# Patient Record
Sex: MALE | Race: WHITE | NOT HISPANIC OR LATINO | Employment: FULL TIME | URBAN - METROPOLITAN AREA
[De-identification: names, ages, dates, MRNs, and addresses within clinical notes are randomized per-mention and may not be internally consistent; named-entity substitution may affect disease eponyms.]

---
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
## 2018-01-13 VITALS
BODY MASS INDEX: 34.91 KG/M2 | OXYGEN SATURATION: 98 % | DIASTOLIC BLOOD PRESSURE: 84 MMHG | TEMPERATURE: 98.7 F | WEIGHT: 257.44 LBS | HEART RATE: 72 BPM | SYSTOLIC BLOOD PRESSURE: 144 MMHG

## 2018-04-11 ENCOUNTER — OFFICE VISIT (OUTPATIENT)
Dept: URGENT CARE | Age: 18
End: 2018-04-11
Payer: COMMERCIAL

## 2018-04-11 VITALS
HEIGHT: 73 IN | RESPIRATION RATE: 18 BRPM | TEMPERATURE: 98.8 F | OXYGEN SATURATION: 97 % | DIASTOLIC BLOOD PRESSURE: 70 MMHG | SYSTOLIC BLOOD PRESSURE: 130 MMHG | HEART RATE: 81 BPM | BODY MASS INDEX: 33 KG/M2 | WEIGHT: 249 LBS

## 2018-04-11 DIAGNOSIS — J20.9 ACUTE BRONCHITIS, UNSPECIFIED ORGANISM: ICD-10-CM

## 2018-04-11 DIAGNOSIS — B34.9 VIRAL SYNDROME: Primary | ICD-10-CM

## 2018-04-11 DIAGNOSIS — J02.9 PHARYNGITIS, UNSPECIFIED ETIOLOGY: ICD-10-CM

## 2018-04-11 LAB — S PYO AG THROAT QL: NEGATIVE

## 2018-04-11 PROCEDURE — G0382 LEV 3 HOSP TYPE B ED VISIT: HCPCS | Performed by: FAMILY MEDICINE

## 2018-04-11 PROCEDURE — 87430 STREP A AG IA: CPT | Performed by: FAMILY MEDICINE

## 2018-04-11 NOTE — PATIENT INSTRUCTIONS
Take Mucinex and Flonase for congestion  Take Advil or Tylenol as directed for muscle aches  Use cool mist humidifier, turning on hours prior to bedtime for maximum relief  Take all medications with food and a full glass of water  Get plenty of rest and lots of fluids  Use throat lozenges, saltwater gargle, and warm honey water as needed for throat relief  Follow BRAT (bananas, rice, apples, toast) diet as discussed  Once feeling up to it, you can begin to introduce new foods and advance diet as it is tolerated  Take Pepto-bismol or Tums as directed for nausea and stomach upset  Stay hydrated, drinking plenty of water and gatorade  If symptoms worsen or if not resolvng, call PCP or go to the ER

## 2018-04-11 NOTE — PROGRESS NOTES
[facility]        NAME: John Paul Gu is a 19 y o  male  : 2000    MRN: [id number]  DATE: 2018  TIME: 3:40 PM    Assessment and Plan   Viral syndrome [B34 9]  1  Viral syndrome     2  Pharyngitis, unspecified etiology  POCT rapid strepA   3  Acute bronchitis, unspecified organism       Patient Instructions   Begin Mucinex and Flonase  Motrin and tylenol for discomfort and fever  Cool mist humidifier at bedtime  Salt water gargle, warm tea with honey, and throat lozenges for throat relief  Increased rest and fluids  BRAT diet  Tums or pepto for nausea and stomach upset  Follow up with PCP in 3-5 days  Proceed to  ER if symptoms worsen  All questions answered  Precautions given  RTW note provided on request   Chief Complaint     Chief Complaint   Patient presents with    Cold Like Symptoms     x 2 days- sore throat with tight cough and chest tightness and pain L side with cough, HA, sl  nasal congestion  Taking NyQuil   Cough    Sore Throat     History of Present Illness   18 y/o male with c/o nausea and vomiting x 2 days  Associated symptoms include fatigue, pressure headache, sore throat, nonproductive cough, and a pressure sensation in chest with coughing  Cough is worse at night, and comes in fits/paroxysms  Cough usually subsides on it's own without treatment but is disrupting of sleep  Minimal relief with attempted treatments of nyquil and dayquil  Denies SOB, wheezing, Cp/tightness, pain on inspiration, or palpitations  No sick contacts or recent travel  Requesting RTW note  Review of Systems   Review of Systems   Constitutional: Negative for chills and fever  HENT: Negative for congestion and rhinorrhea  Eyes: Negative for discharge, redness and itching  Respiratory: Negative for shortness of breath and wheezing  Cardiovascular: Negative for chest pain and palpitations  Gastrointestinal: Negative for abdominal pain     Musculoskeletal: Negative for myalgias  Skin: Negative for rash  Current Medications     No current outpatient prescriptions on file  Current Allergies     Allergies as of 04/11/2018    (No Known Allergies)          The following portions of the patient's history were reviewed and updated as appropriate: allergies, current medications, past family history, past medical history, past social history, past surgical history and problem list      History reviewed  No pertinent past medical history  Past Surgical History:   Procedure Laterality Date    HAND FRACTURE REPAIR Right        Family History   Problem Relation Age of Onset    Family history unknown: Yes     Medications have been verified  Objective   /70 (BP Location: Right arm, Patient Position: Sitting, Cuff Size: Large)   Pulse 81   Temp 98 8 °F (37 1 °C) (Oral)   Resp 18   Ht 6' 1" (1 854 m)   Wt 113 kg (249 lb)   SpO2 97%   BMI 32 85 kg/m²      Rapid strep negative  Will not send for culture based on clinical presentation  Physical Exam     Physical Exam   Constitutional: He is oriented to person, place, and time  He appears well-developed and well-nourished  He appears ill  No distress  HENT:   Head: Normocephalic and atraumatic  Right Ear: Tympanic membrane, external ear and ear canal normal    Left Ear: Tympanic membrane, external ear and ear canal normal    Nose: Mucosal edema present  No rhinorrhea  Mouth/Throat: Uvula is midline, oropharynx is clear and moist and mucous membranes are normal  Mucous membranes are not pale and not dry  Mild erythema of posterior pharynx without exudate or edema  Eyes: Conjunctivae are normal    Neck: Neck supple  Cardiovascular: Normal rate, regular rhythm and normal heart sounds  Pulmonary/Chest: Effort normal and breath sounds normal  No respiratory distress  He has no wheezes  He has no rales  Abdominal: Soft  Bowel sounds are normal  He exhibits no distension  There is no tenderness  Lymphadenopathy:     He has no cervical adenopathy  Neurological: He is alert and oriented to person, place, and time  No cranial nerve deficit  He exhibits normal muscle tone  Coordination normal    Skin: Skin is warm and dry  No rash noted  He is not diaphoretic  Psychiatric: He has a normal mood and affect  His behavior is normal    Nursing note and vitals reviewed

## 2018-04-11 NOTE — LETTER
April 11, 2018     Patient: Luan Atkinson   YOB: 2000   Date of Visit: 4/11/2018       To Whom It May Concern: It is my medical opinion that Caleb Gaona may return to work on 4/12/2018  If you have any questions or concerns, please don't hesitate to call           Sincerely,  Dora Em, [address]

## 2018-04-25 VITALS
HEIGHT: 73 IN | HEART RATE: 67 BPM | BODY MASS INDEX: 33.02 KG/M2 | WEIGHT: 249.12 LBS | SYSTOLIC BLOOD PRESSURE: 134 MMHG | DIASTOLIC BLOOD PRESSURE: 71 MMHG

## 2018-04-25 DIAGNOSIS — S63.90XA SPRAIN OF HAND, UNSPECIFIED LATERALITY, INITIAL ENCOUNTER: Primary | ICD-10-CM

## 2018-04-25 PROCEDURE — 99204 OFFICE O/P NEW MOD 45 MIN: CPT | Performed by: ORTHOPAEDIC SURGERY

## 2018-04-25 NOTE — LETTER
April 25, 2018     Patient: Sylvia Braxton   YOB: 2000   Date of Visit: 4/25/2018       To Whom it May Concern:    Дмитрий Rodriguez is under my professional care  He was seen in my office on 4/25/2018  He may return to work on 04/29/2018  activity as tolerated  If you have any questions or concerns, please don't hesitate to call           Sincerely,          Aquiles Powers DO        CC: Sylvia Braxton

## 2019-06-03 ENCOUNTER — OFFICE VISIT (OUTPATIENT)
Dept: FAMILY MEDICINE CLINIC | Facility: OTHER | Age: 19
End: 2019-06-03
Payer: COMMERCIAL

## 2019-06-03 VITALS
WEIGHT: 241 LBS | SYSTOLIC BLOOD PRESSURE: 110 MMHG | DIASTOLIC BLOOD PRESSURE: 78 MMHG | HEART RATE: 63 BPM | TEMPERATURE: 97.5 F | HEIGHT: 73 IN | BODY MASS INDEX: 31.94 KG/M2 | OXYGEN SATURATION: 97 %

## 2019-06-03 DIAGNOSIS — J06.9 VIRAL UPPER RESPIRATORY TRACT INFECTION: Primary | ICD-10-CM

## 2019-06-03 PROCEDURE — 3008F BODY MASS INDEX DOCD: CPT | Performed by: NURSE PRACTITIONER

## 2019-06-03 PROCEDURE — 99214 OFFICE O/P EST MOD 30 MIN: CPT | Performed by: NURSE PRACTITIONER

## 2019-06-03 PROCEDURE — 1036F TOBACCO NON-USER: CPT | Performed by: NURSE PRACTITIONER

## 2019-06-03 RX ORDER — PROMETHAZINE HYDROCHLORIDE AND CODEINE PHOSPHATE 6.25; 1 MG/5ML; MG/5ML
5 SYRUP ORAL EVERY 4 HOURS PRN
Qty: 180 ML | Refills: 0 | Status: SHIPPED | OUTPATIENT
Start: 2019-06-03 | End: 2020-12-02

## 2019-06-03 RX ORDER — ALBUTEROL SULFATE 90 UG/1
2 AEROSOL, METERED RESPIRATORY (INHALATION) EVERY 4 HOURS PRN
Qty: 1 INHALER | Refills: 5 | Status: SHIPPED | OUTPATIENT
Start: 2019-06-03

## 2020-05-04 ENCOUNTER — TELEMEDICINE (OUTPATIENT)
Dept: FAMILY MEDICINE CLINIC | Facility: OTHER | Age: 20
End: 2020-05-04
Payer: COMMERCIAL

## 2020-05-04 DIAGNOSIS — R19.7 DIARRHEA, UNSPECIFIED TYPE: Primary | ICD-10-CM

## 2020-05-04 PROCEDURE — 99213 OFFICE O/P EST LOW 20 MIN: CPT | Performed by: NURSE PRACTITIONER

## 2020-07-09 ENCOUNTER — APPOINTMENT (OUTPATIENT)
Dept: RADIOLOGY | Facility: CLINIC | Age: 20
End: 2020-07-09
Payer: COMMERCIAL

## 2020-07-09 ENCOUNTER — NURSE TRIAGE (OUTPATIENT)
Dept: OTHER | Facility: OTHER | Age: 20
End: 2020-07-09

## 2020-07-09 ENCOUNTER — OFFICE VISIT (OUTPATIENT)
Dept: URGENT CARE | Facility: CLINIC | Age: 20
End: 2020-07-09
Payer: COMMERCIAL

## 2020-07-09 VITALS
DIASTOLIC BLOOD PRESSURE: 73 MMHG | BODY MASS INDEX: 31.14 KG/M2 | HEART RATE: 53 BPM | OXYGEN SATURATION: 99 % | SYSTOLIC BLOOD PRESSURE: 147 MMHG | WEIGHT: 235 LBS | RESPIRATION RATE: 18 BRPM | TEMPERATURE: 98.1 F | HEIGHT: 73 IN

## 2020-07-09 DIAGNOSIS — S63.501A SPRAIN OF RIGHT WRIST, INITIAL ENCOUNTER: Primary | ICD-10-CM

## 2020-07-09 DIAGNOSIS — M79.641 PAIN OF RIGHT HAND: ICD-10-CM

## 2020-07-09 DIAGNOSIS — M79.641 RIGHT HAND PAIN: ICD-10-CM

## 2020-07-09 PROCEDURE — G0382 LEV 3 HOSP TYPE B ED VISIT: HCPCS | Performed by: NURSE PRACTITIONER

## 2020-07-09 PROCEDURE — 29125 APPL SHORT ARM SPLINT STATIC: CPT | Performed by: NURSE PRACTITIONER

## 2020-07-09 PROCEDURE — 73110 X-RAY EXAM OF WRIST: CPT

## 2020-07-09 PROCEDURE — 73130 X-RAY EXAM OF HAND: CPT

## 2020-07-09 NOTE — PROGRESS NOTES
[facility]        NAME: Holly Arreguin is a 23 y o  male  : 2000    MRN: [id number]  DATE: 2020  TIME: 1:35 PM    Assessment and Plan   Sprain of right wrist, initial encounter [S63 501A]  1  Sprain of right wrist, initial encounter  XR hand 3+ vw right    XR wrist 3+ vw right   2  Right hand pain           Patient Instructions   Wear splint for comfort   Apply ice 20 min every 2-3 hours   Iburpofen 600mg every 6 hours for pain and swelling   Follow up with your PCP or ortho If pain persists      Follow up with PCP in 3-5 days  Proceed to  ER if symptoms worsen  Chief Complaint     Chief Complaint   Patient presents with    Hand Injury     Rt hand injury  Patient states he punched a wall  Mild swelling         History of Present Illness       Patient is a 23year old male presenting with right hand pain after punching a wall last night  Pain is located on the lateral aspect of the hand and on the ulnar aspect of the wrist  Denies N/T/W  No OTC treatment PTA  He is LHD  Review of Systems   Review of Systems   Constitutional: Negative for activity change, chills and fever  Respiratory: Negative for cough and shortness of breath  Musculoskeletal: Positive for arthralgias and joint swelling  Negative for myalgias  Skin: Positive for color change  Neurological: Negative for weakness and headaches           Current Medications       Current Outpatient Medications:     albuterol (PROVENTIL HFA,VENTOLIN HFA) 90 mcg/act inhaler, Inhale 2 puffs every 4 (four) hours as needed for wheezing or shortness of breath (Patient not taking: Reported on 2020), Disp: 1 Inhaler, Rfl: 5    promethazine-codeine (PHENERGAN WITH CODEINE) 6 25-10 mg/5 mL syrup, Take 5 mL by mouth every 4 (four) hours as needed for cough (Patient not taking: Reported on 2020), Disp: 180 mL, Rfl: 0    Current Allergies     Allergies as of 2020    (No Known Allergies)            The following portions of the patient's history were reviewed and updated as appropriate: allergies, current medications, past family history, past medical history, past social history, past surgical history and problem list      History reviewed  No pertinent past medical history  Past Surgical History:   Procedure Laterality Date    HAND FRACTURE REPAIR Right        Family History   Problem Relation Age of Onset    Arthritis Maternal Grandmother          Medications have been verified  Objective   /73   Pulse (!) 53   Temp 98 1 °F (36 7 °C) (Tympanic)   Resp 18   Ht 6' 1" (1 854 m)   Wt 107 kg (235 lb)   SpO2 99%   BMI 31 00 kg/m²      Right hand xray: negative for fracture  Right wrist xray: negative for fracture  Physical Exam     Physical Exam   Constitutional: He is oriented to person, place, and time  Vital signs are normal  He appears well-developed and well-nourished  He is active  No distress  HENT:   Head: Normocephalic  Right Ear: Hearing normal    Left Ear: Hearing normal    Cardiovascular: Regular rhythm, S1 normal, S2 normal and normal heart sounds  Bradycardia present  Pulmonary/Chest: Effort normal and breath sounds normal  No respiratory distress  He has no decreased breath sounds  He has no wheezes  He has no rhonchi  He has no rales  Musculoskeletal:        Right wrist: He exhibits decreased range of motion, tenderness and swelling  Left hand: He exhibits decreased range of motion, tenderness and swelling  Hands:  Neurological: He is alert and oriented to person, place, and time  He is not disoriented  No sensory deficit  Skin: Ecchymosis noted  Orthopedic injury treatment  Date/Time: 7/9/2020 1:33 PM  Performed by: GRIFFIN Bowles  Authorized by: GRIFFIN Bowles     Patient Location:  Clinic  Verbal consent obtained?: Yes    Consent given by:  Patient  Injury location:  Wrist  Location details:  Right wrist  Injury type:   Soft tissue  Neurovascular status: Neurovascularly intact    Distal perfusion: normal    Neurological function: normal    Range of motion: reduced    Local anesthesia used?: No    Immobilization:  Splint (Static )  Splint type:  Wrist  Cast type: Velcro laceup wrist splint   Neurovascular status: Neurovascularly intact    Distal perfusion: normal    Neurological function: normal    Range of motion: unchanged    Patient tolerance:  Patient tolerated the procedure well with no immediate complications   Reports improvement in pain with splint

## 2020-07-09 NOTE — LETTER
July 9, 2020     Patient: Anupam Shipman   YOB: 2000   Date of Visit: 7/9/2020       To Whom it May Concern:    Elisa Fisher was seen in my clinic on 7/9/2020  He may return to work on 7/10/2020  If you have any questions or concerns, please don't hesitate to call  Sincerely,          GRIFFIN Rebolledo      CC: Judi Faustin

## 2020-07-09 NOTE — TELEPHONE ENCOUNTER
Reason for Disposition   Can't use injured hand normally (e g , make a fist, open fully, hold a glass of water)    Answer Assessment - Initial Assessment Questions  1  MECHANISM: "How did the injury happen?"      Punched a wall  2  ONSET: "When did the injury happen?" (Minutes or hours ago)       Last night  3  APPEARANCE of INJURY: "What does the injury look like?"       Right pinkie finger and wrist    4  SEVERITY: "Can you use the hand normally?" "Can you bend your fingers into a ball and then fully open them?"      He is able to bend the pinkie  5  SIZE: For cuts, bruises, or swelling, ask: "How large is it?" (e g , inches or centimeters;  entire hand or wrist)       Mildly swollen and bruised  6  PAIN: "Is there pain?" If so, ask: "How bad is the pain?"  (Scale 1-10; or mild, moderate, severe)      Moderately painful  8  OTHER SYMPTOMS: "Do you have any other symptoms?"       Hand feels shaky  Denied numbness      Protocols used: HAND AND WRIST INJURY-ADULT-

## 2020-07-09 NOTE — PATIENT INSTRUCTIONS
Wear splint for comfort   Apply ice 20 min every 2-3 hours   Iburpofen 600mg every 6 hours for pain and swelling   Follow up with your PCP or ortho If pain persists    Wrist Sprain   WHAT YOU NEED TO KNOW:   A wrist sprain happens when one or more ligaments in your wrist stretch or tear  Ligaments are tough tissues that connect bones and keep them in place, and support your joints  DISCHARGE INSTRUCTIONS:   Return to the emergency department if:   · You have severe pain or swelling  · Your injured wrist is red or has red streaks spreading from the injured area  · You have new trouble moving your hands, fingers, or wrist     · Your wrist, hand, or fingers feel cold or numb  · Your fingernails turn blue or gray  Contact your healthcare provider if:   · Your symptoms get worse  · You have pain and swelling for more than 48 hours  · You have questions or concerns about your condition or care  Medicines:   · NSAIDs , such as ibuprofen, help decrease swelling, pain, and fever  NSAIDs can cause stomach bleeding or kidney problems in certain people  If you take blood thinner medicine, always ask your healthcare provider if NSAIDs are safe for you  Always read the medicine label and follow directions  · Acetaminophen  decreases pain and fever  It is available without a doctor's order  Ask how much to take and how often to take it  Follow directions  Read the labels of all other medicines you are using to see if they also contain acetaminophen, or ask your doctor or pharmacist  Acetaminophen can cause liver damage if not taken correctly  Do not use more than 4 grams (4,000 milligrams) total of acetaminophen in one day  · Take your medicine as directed  Contact your healthcare provider if you think your medicine is not helping or if you have side effects  Tell him or her if you are allergic to any medicine  Keep a list of the medicines, vitamins, and herbs you take   Include the amounts, and when and why you take them  Bring the list or the pill bottles to follow-up visits  Carry your medicine list with you in case of an emergency  Self-care:   · Rest  your wrist for at least 48 hours  Avoid activities that cause pain  · Ice  your wrist for 15 to 20 minutes every hour or as directed  Use an ice pack, or put crushed ice in a plastic bag  Cover it with a towel before you put it on your wrist  Ice helps prevent tissue damage and decreases swelling and pain  · Compress  your wrist with an elastic bandage  This will help decrease swelling, support your wrist, and help it heal  Wear your wrist wrap as directed  The elastic bandage should be snug but not tight  · Elevate  your wrist above the level of your heart as often as you can  This will help decrease swelling and pain  Prop your wrist on pillows or blankets to keep it elevated comfortably  Wrist support: You may need to wear a splint or cast to support your wrist and prevent more damage  Wear your splint as directed  Ask for instructions on how to bathe while you are wearing a splint or cast    Physical therapy:  Your healthcare provider may recommend that you go to physical therapy  A physical therapist teaches you exercises to help improve movement and strength, and to decrease pain  Follow up with your healthcare provider as directed:  Write down your questions so you remember to ask them during your visits  © 2017 [employer] Information is for End User's use only and may not be sold, redistributed or otherwise used for commercial purposes  All illustrations and images included in CareNotes® are the copyrighted property of A D A M [employer] Inc  or [employer]  The above information is an  only  It is not intended as medical advice for individual conditions or treatments  Talk to your doctor, nurse or pharmacist before following any medical regimen to see if it is safe and effective for you

## 2020-07-09 NOTE — TELEPHONE ENCOUNTER
Regarding: injured hand  ----- Message from Arpan Rodriguez sent at 7/9/2020  7:35 AM EDT -----  Pt hurt his hand and needs to know if he should go to the hospital or what he should do

## 2020-07-21 ENCOUNTER — TELEMEDICINE (OUTPATIENT)
Dept: FAMILY MEDICINE CLINIC | Facility: CLINIC | Age: 20
End: 2020-07-21
Payer: COMMERCIAL

## 2020-07-21 DIAGNOSIS — Z20.828 EXPOSURE TO SARS-ASSOCIATED CORONAVIRUS: Primary | ICD-10-CM

## 2020-07-21 DIAGNOSIS — Z20.828 EXPOSURE TO SARS-ASSOCIATED CORONAVIRUS: ICD-10-CM

## 2020-07-21 PROCEDURE — 99212 OFFICE O/P EST SF 10 MIN: CPT | Performed by: NURSE PRACTITIONER

## 2020-07-21 PROCEDURE — U0003 INFECTIOUS AGENT DETECTION BY NUCLEIC ACID (DNA OR RNA); SEVERE ACUTE RESPIRATORY SYNDROME CORONAVIRUS 2 (SARS-COV-2) (CORONAVIRUS DISEASE [COVID-19]), AMPLIFIED PROBE TECHNIQUE, MAKING USE OF HIGH THROUGHPUT TECHNOLOGIES AS DESCRIBED BY CMS-2020-01-R: HCPCS

## 2020-07-21 NOTE — PROGRESS NOTES
COVID-19 Virtual Visit     Assessment/Plan:    Problem List Items Addressed This Visit     None      Visit Diagnoses     Exposure to SARS-associated coronavirus    -  Primary    Relevant Orders    MISC COVID-19 TEST- Collected at [facility] or [facility]        This virtual check-in was done via [employer] and patient was informed that this is not a secure, HIPAA-complaint platform  He agrees to proceed     Disposition:      I referred Horacio Steinberg to one of our centralized sites for a COVID-19 swab    I spent 10 minutes directly with the patient during this visit    Encounter provider GRIFFIN Wiseman    Provider located at [address] [place] [address],Third Floor  [facility] [address] [address] [phone]    Recent Visits  No visits were found meeting these conditions  Showing recent visits within past 7 days and meeting all other requirements     Today's Visits  Date Type Provider Dept   07/21/20 [facility] today's visits and meeting all other requirements     Future Appointments  Date Type Provider Dept   [facility]   Showing future appointments within next 150 days and meeting all other requirements        Patient agrees to participate in a virtual check in via telephone or video visit instead of presenting to the office to address urgent/immediate medical needs  Patient is aware this is a billable service  After connecting through [employer], the patient was identified by name and date of birth  Holly Arreguin was informed that this was a telemedicine visit and that the exam was being conducted confidentially over secure lines  My office door was closed  No one else was in the room  Holly Arreguin acknowledged consent and understanding of privacy and security of the telemedicine visit    I informed the patient that I have reviewed his record in Epic and presented the opportunity for him to ask any questions regarding the visit today  The patient agreed to participate  Subjective  Cathryne Brunner is a 23 y o  male who is concerned about COVID-19  He reports no symptoms, requires screening  He has not experienced no symptoms, requires screening He has had contact with a person who is under investigation for or who is positive for COVID-19 within the last 14 days  He has not been hospitalized recently for fever and/or lower respiratory symptoms  No past medical history on file  Past Surgical History:   Procedure Laterality Date    HAND FRACTURE REPAIR Right        Current Outpatient Medications   Medication Sig Dispense Refill    albuterol (PROVENTIL HFA,VENTOLIN HFA) 90 mcg/act inhaler Inhale 2 puffs every 4 (four) hours as needed for wheezing or shortness of breath (Patient not taking: Reported on 7/9/2020) 1 Inhaler 5    promethazine-codeine (PHENERGAN WITH CODEINE) 6 25-10 mg/5 mL syrup Take 5 mL by mouth every 4 (four) hours as needed for cough (Patient not taking: Reported on 7/9/2020) 180 mL 0     No current facility-administered medications for this visit  No Known Allergies    Review of Systems   Constitutional: Negative  HENT: Negative  Eyes: Negative  Respiratory: Negative  Cardiovascular: Negative  Gastrointestinal: Negative  Endocrine: Negative  Genitourinary: Negative  Musculoskeletal: Negative  Skin: Negative  Allergic/Immunologic: Negative  Neurological: Negative  Hematological: Negative  Psychiatric/Behavioral: Negative  All other systems reviewed and are negative  Video Exam    There were no vitals filed for this visit  Jie Perry appears healthy, alert, no distress  Physical Exam   Constitutional: He is oriented to person, place, and time  He appears well-developed and well-nourished  HENT:   Head: Normocephalic  Eyes: Conjunctivae are normal    Neck: Normal range of motion     Pulmonary/Chest: Effort normal    Musculoskeletal: Normal range of motion  Neurological: He is alert and oriented to person, place, and time  Psychiatric: He has a normal mood and affect  His behavior is normal  Judgment and thought content normal    Nursing note and vitals reviewed  VIRTUAL VISIT DISCLAIMER    Cali Banks acknowledges that he has consented to an online visit or consultation  He understands that the online visit is based solely on information provided by him, and that, in the absence of a face-to-face physical evaluation by the physician, the diagnosis he receives is both limited and provisional in terms of accuracy and completeness  This is not intended to replace a full medical face-to-face evaluation by the physician  Cali Banks understands and accepts these terms

## 2020-07-24 LAB — SARS-COV-2 RNA SPEC QL NAA+PROBE: NOT DETECTED

## 2020-10-05 ENCOUNTER — APPOINTMENT (OUTPATIENT)
Dept: URGENT CARE | Facility: CLINIC | Age: 20
End: 2020-10-05

## 2020-10-05 ENCOUNTER — TRANSCRIBE ORDERS (OUTPATIENT)
Dept: URGENT CARE | Facility: CLINIC | Age: 20
End: 2020-10-05

## 2020-10-05 DIAGNOSIS — Z13.9 SCREENING FOR UNSPECIFIED CONDITION: Primary | ICD-10-CM

## 2020-10-09 PROCEDURE — U0003 INFECTIOUS AGENT DETECTION BY NUCLEIC ACID (DNA OR RNA); SEVERE ACUTE RESPIRATORY SYNDROME CORONAVIRUS 2 (SARS-COV-2) (CORONAVIRUS DISEASE [COVID-19]), AMPLIFIED PROBE TECHNIQUE, MAKING USE OF HIGH THROUGHPUT TECHNOLOGIES AS DESCRIBED BY CMS-2020-01-R: HCPCS | Performed by: NURSE PRACTITIONER

## 2020-10-10 LAB — SARS-COV-2 RNA SPEC QL NAA+PROBE: NOT DETECTED

## 2020-11-01 ENCOUNTER — NURSE TRIAGE (OUTPATIENT)
Dept: OTHER | Facility: OTHER | Age: 20
End: 2020-11-01

## 2020-11-01 ENCOUNTER — DOCUMENTATION (OUTPATIENT)
Dept: FAMILY MEDICINE CLINIC | Facility: MEDICAL CENTER | Age: 20
End: 2020-11-01

## 2020-11-01 DIAGNOSIS — Z20.828 EXPOSURE TO SARS VIRUS: Primary | ICD-10-CM

## 2020-11-01 DIAGNOSIS — Z20.828 EXPOSURE TO SARS VIRUS: ICD-10-CM

## 2020-11-01 PROCEDURE — U0003 INFECTIOUS AGENT DETECTION BY NUCLEIC ACID (DNA OR RNA); SEVERE ACUTE RESPIRATORY SYNDROME CORONAVIRUS 2 (SARS-COV-2) (CORONAVIRUS DISEASE [COVID-19]), AMPLIFIED PROBE TECHNIQUE, MAKING USE OF HIGH THROUGHPUT TECHNOLOGIES AS DESCRIBED BY CMS-2020-01-R: HCPCS | Performed by: NURSE PRACTITIONER

## 2020-11-03 LAB — SARS-COV-2 RNA SPEC QL NAA+PROBE: NOT DETECTED

## 2020-12-02 ENCOUNTER — DOCUMENTATION (OUTPATIENT)
Dept: FAMILY MEDICINE CLINIC | Facility: MEDICAL CENTER | Age: 20
End: 2020-12-02

## 2020-12-02 DIAGNOSIS — Z20.822 CLOSE EXPOSURE TO COVID-19 VIRUS: Primary | ICD-10-CM

## 2020-12-03 DIAGNOSIS — Z20.822 CLOSE EXPOSURE TO COVID-19 VIRUS: ICD-10-CM

## 2020-12-03 PROCEDURE — U0003 INFECTIOUS AGENT DETECTION BY NUCLEIC ACID (DNA OR RNA); SEVERE ACUTE RESPIRATORY SYNDROME CORONAVIRUS 2 (SARS-COV-2) (CORONAVIRUS DISEASE [COVID-19]), AMPLIFIED PROBE TECHNIQUE, MAKING USE OF HIGH THROUGHPUT TECHNOLOGIES AS DESCRIBED BY CMS-2020-01-R: HCPCS | Performed by: NURSE PRACTITIONER

## 2020-12-04 LAB — SARS-COV-2 RNA SPEC QL NAA+PROBE: NOT DETECTED

## 2021-03-20 ENCOUNTER — APPOINTMENT (EMERGENCY)
Dept: CT IMAGING | Facility: HOSPITAL | Age: 21
End: 2021-03-20
Payer: COMMERCIAL

## 2021-03-20 ENCOUNTER — HOSPITAL ENCOUNTER (EMERGENCY)
Facility: HOSPITAL | Age: 21
Discharge: HOME | End: 2021-03-20
Attending: EMERGENCY MEDICINE | Admitting: EMERGENCY MEDICINE
Payer: COMMERCIAL

## 2021-03-20 ENCOUNTER — APPOINTMENT (EMERGENCY)
Dept: RADIOLOGY | Facility: HOSPITAL | Age: 21
End: 2021-03-20
Payer: COMMERCIAL

## 2021-03-20 VITALS
SYSTOLIC BLOOD PRESSURE: 137 MMHG | OXYGEN SATURATION: 98 % | HEART RATE: 56 BPM | TEMPERATURE: 98.7 F | DIASTOLIC BLOOD PRESSURE: 74 MMHG | RESPIRATION RATE: 16 BRPM

## 2021-03-20 DIAGNOSIS — R10.9 ABDOMINAL PAIN: ICD-10-CM

## 2021-03-20 DIAGNOSIS — R11.2 NAUSEA AND VOMITING: ICD-10-CM

## 2021-03-20 DIAGNOSIS — J06.9 URI (UPPER RESPIRATORY INFECTION): Primary | ICD-10-CM

## 2021-03-20 LAB
ALBUMIN SERPL BCP-MCNC: 4.2 G/DL (ref 3.5–5)
ALP SERPL-CCNC: 76 U/L (ref 46–116)
ALT SERPL W P-5'-P-CCNC: 18 U/L (ref 12–78)
ANION GAP SERPL CALCULATED.3IONS-SCNC: 12 MMOL/L (ref 4–13)
AST SERPL W P-5'-P-CCNC: 12 U/L (ref 5–45)
BASOPHILS # BLD AUTO: 0.05 THOUSANDS/ΜL (ref 0–0.1)
BASOPHILS NFR BLD AUTO: 0 % (ref 0–1)
BILIRUB SERPL-MCNC: 0.41 MG/DL (ref 0.2–1)
BILIRUB UR QL STRIP: NEGATIVE
BUN SERPL-MCNC: 11 MG/DL (ref 5–25)
CALCIUM SERPL-MCNC: 9.5 MG/DL (ref 8.3–10.1)
CHLORIDE SERPL-SCNC: 102 MMOL/L (ref 100–108)
CLARITY UR: CLEAR
CO2 SERPL-SCNC: 26 MMOL/L (ref 21–32)
COLOR UR: YELLOW
CREAT SERPL-MCNC: 0.95 MG/DL (ref 0.6–1.3)
EOSINOPHIL # BLD AUTO: 0.05 THOUSAND/ΜL (ref 0–0.61)
EOSINOPHIL NFR BLD AUTO: 0 % (ref 0–6)
ERYTHROCYTE [DISTWIDTH] IN BLOOD BY AUTOMATED COUNT: 14 % (ref 11.6–15.1)
GFR SERPL CREATININE-BSD FRML MDRD: 112 ML/MIN/1.73SQ M
GLUCOSE SERPL-MCNC: 100 MG/DL (ref 65–140)
GLUCOSE UR STRIP-MCNC: NEGATIVE MG/DL
HCT VFR BLD AUTO: 41.8 % (ref 36.5–49.3)
HGB BLD-MCNC: 14.6 G/DL (ref 12–17)
HGB UR QL STRIP.AUTO: NEGATIVE
IMM GRANULOCYTES # BLD AUTO: 0.08 THOUSAND/UL (ref 0–0.2)
IMM GRANULOCYTES NFR BLD AUTO: 1 % (ref 0–2)
KETONES UR STRIP-MCNC: ABNORMAL MG/DL
LACTATE SERPL-SCNC: 0.4 MMOL/L (ref 0.5–2)
LEUKOCYTE ESTERASE UR QL STRIP: NEGATIVE
LIPASE SERPL-CCNC: 66 U/L (ref 73–393)
LYMPHOCYTES # BLD AUTO: 2.79 THOUSANDS/ΜL (ref 0.6–4.47)
LYMPHOCYTES NFR BLD AUTO: 18 % (ref 14–44)
MCH RBC QN AUTO: 31 PG (ref 26.8–34.3)
MCHC RBC AUTO-ENTMCNC: 34.9 G/DL (ref 31.4–37.4)
MCV RBC AUTO: 89 FL (ref 82–98)
MONOCYTES # BLD AUTO: 0.73 THOUSAND/ΜL (ref 0.17–1.22)
MONOCYTES NFR BLD AUTO: 5 % (ref 4–12)
NEUTROPHILS # BLD AUTO: 12.11 THOUSANDS/ΜL (ref 1.85–7.62)
NEUTS SEG NFR BLD AUTO: 76 % (ref 43–75)
NITRITE UR QL STRIP: NEGATIVE
NRBC BLD AUTO-RTO: 0 /100 WBCS
PH UR STRIP.AUTO: 5.5 [PH]
PLATELET # BLD AUTO: 241 THOUSANDS/UL (ref 149–390)
PMV BLD AUTO: 9.4 FL (ref 8.9–12.7)
POTASSIUM SERPL-SCNC: 3.7 MMOL/L (ref 3.5–5.3)
PROT SERPL-MCNC: 7.7 G/DL (ref 6.4–8.2)
PROT UR STRIP-MCNC: NEGATIVE MG/DL
RBC # BLD AUTO: 4.71 MILLION/UL (ref 3.88–5.62)
SODIUM SERPL-SCNC: 140 MMOL/L (ref 136–145)
SP GR UR STRIP.AUTO: 1.02 (ref 1–1.03)
UROBILINOGEN UR QL STRIP.AUTO: 0.2 E.U./DL
WBC # BLD AUTO: 15.81 THOUSAND/UL (ref 4.31–10.16)

## 2021-03-20 PROCEDURE — 84145 PROCALCITONIN (PCT): CPT | Performed by: PHYSICIAN ASSISTANT

## 2021-03-20 PROCEDURE — 36415 COLL VENOUS BLD VENIPUNCTURE: CPT | Performed by: PHYSICIAN ASSISTANT

## 2021-03-20 PROCEDURE — 86308 HETEROPHILE ANTIBODY SCREEN: CPT | Performed by: PHYSICIAN ASSISTANT

## 2021-03-20 PROCEDURE — 99284 EMERGENCY DEPT VISIT MOD MDM: CPT

## 2021-03-20 PROCEDURE — 99284 EMERGENCY DEPT VISIT MOD MDM: CPT | Performed by: PHYSICIAN ASSISTANT

## 2021-03-20 PROCEDURE — 81003 URINALYSIS AUTO W/O SCOPE: CPT | Performed by: PHYSICIAN ASSISTANT

## 2021-03-20 PROCEDURE — 83690 ASSAY OF LIPASE: CPT | Performed by: PHYSICIAN ASSISTANT

## 2021-03-20 PROCEDURE — 85025 COMPLETE CBC W/AUTO DIFF WBC: CPT | Performed by: PHYSICIAN ASSISTANT

## 2021-03-20 PROCEDURE — 71045 X-RAY EXAM CHEST 1 VIEW: CPT

## 2021-03-20 PROCEDURE — 74177 CT ABD & PELVIS W/CONTRAST: CPT

## 2021-03-20 PROCEDURE — 96374 THER/PROPH/DIAG INJ IV PUSH: CPT

## 2021-03-20 PROCEDURE — 83605 ASSAY OF LACTIC ACID: CPT | Performed by: PHYSICIAN ASSISTANT

## 2021-03-20 PROCEDURE — 80053 COMPREHEN METABOLIC PANEL: CPT | Performed by: PHYSICIAN ASSISTANT

## 2021-03-20 PROCEDURE — G1004 CDSM NDSC: HCPCS

## 2021-03-20 PROCEDURE — 96375 TX/PRO/DX INJ NEW DRUG ADDON: CPT

## 2021-03-20 PROCEDURE — 96361 HYDRATE IV INFUSION ADD-ON: CPT

## 2021-03-20 PROCEDURE — 87040 BLOOD CULTURE FOR BACTERIA: CPT | Performed by: PHYSICIAN ASSISTANT

## 2021-03-20 RX ORDER — SODIUM CHLORIDE 9 MG/ML
3 INJECTION INTRAVENOUS
Status: DISCONTINUED | OUTPATIENT
Start: 2021-03-20 | End: 2021-03-20 | Stop reason: HOSPADM

## 2021-03-20 RX ORDER — KETOROLAC TROMETHAMINE 30 MG/ML
15 INJECTION, SOLUTION INTRAMUSCULAR; INTRAVENOUS ONCE
Status: COMPLETED | OUTPATIENT
Start: 2021-03-20 | End: 2021-03-20

## 2021-03-20 RX ORDER — ONDANSETRON 2 MG/ML
4 INJECTION INTRAMUSCULAR; INTRAVENOUS ONCE
Status: COMPLETED | OUTPATIENT
Start: 2021-03-20 | End: 2021-03-20

## 2021-03-20 RX ADMIN — KETOROLAC TROMETHAMINE 15 MG: 30 INJECTION, SOLUTION INTRAMUSCULAR at 15:53

## 2021-03-20 RX ADMIN — ONDANSETRON 4 MG: 2 INJECTION INTRAMUSCULAR; INTRAVENOUS at 15:54

## 2021-03-20 RX ADMIN — SODIUM CHLORIDE 1000 ML: 0.9 INJECTION, SOLUTION INTRAVENOUS at 15:51

## 2021-03-20 RX ADMIN — IOHEXOL 100 ML: 350 INJECTION, SOLUTION INTRAVENOUS at 16:45

## 2021-03-20 NOTE — ED PROVIDER NOTES
History  Chief Complaint   Patient presents with    Abdominal Pain     Pt presents to the ED with cough, chills, general body aches over the last week  [facility] reports [facility] WBC  Abd pain, nausea and vomiting with poor intake   Cough     Ale Choi is a 19 y o  male who presents to the ED with complaints of cough, nasal congestion, sore throat, body aches, abdominal discomfort, nausea, vomiting and decreased oral intake over the past few days  Patient was seen in outside urgent care during which time he tested negative for COVID but did have a significant leukocytosis  Patient had an outpatient chest x-ray and was referred to the emergency room for further evaluation  Patient states he has been taking Tylenol but does not report a fever  Patient reports urinary frequency yesterday  Patient denies sick contacts  History provided by:  Patient  Abdominal Pain  Pain location:  Epigastric and periumbilical  Pain quality: aching    Duration:  3 days  Timing:  Intermittent  Associated symptoms: anorexia, chills, cough, diarrhea, fatigue, nausea, sore throat and vomiting    Associated symptoms: no chest pain, no constipation, no dysuria, no fever, no hematuria and no shortness of breath    Cough  Associated symptoms: chills, headaches, myalgias and sore throat    Associated symptoms: no chest pain, no ear pain, no eye discharge, no fever, no rash, no rhinorrhea, no shortness of breath and no wheezing        Prior to Admission Medications   Prescriptions Last Dose Informant Patient Reported? Taking? albuterol (PROVENTIL HFA,VENTOLIN HFA) 90 mcg/act inhaler   No No   Sig: Inhale 2 puffs every 4 (four) hours as needed for wheezing or shortness of breath   Patient not taking: Reported on 7/9/2020      Facility-Administered Medications: None       History reviewed  No pertinent past medical history      Past Surgical History:   Procedure Laterality Date    HAND FRACTURE REPAIR Right        Family History   Problem Relation Age of Onset    Arthritis Maternal Grandmother      I have reviewed and agree with the history as documented  E-Cigarette/Vaping     E-Cigarette/Vaping Substances     Social History     Tobacco Use    Smoking status: Never Smoker    Smokeless tobacco: Never Used   Substance Use Topics    Alcohol use: Yes     Frequency: 2-4 times a month    Drug use: Yes     Types: Marijuana       Review of Systems   Constitutional: Positive for appetite change, chills and fatigue  Negative for fever and unexpected weight change  HENT: Positive for congestion and sore throat  Negative for drooling, ear pain, rhinorrhea, trouble swallowing and voice change  Eyes: Negative for pain, discharge, redness and visual disturbance  Respiratory: Positive for cough  Negative for shortness of breath, wheezing and stridor  Cardiovascular: Negative for chest pain, palpitations and leg swelling  Gastrointestinal: Positive for abdominal pain, anorexia, diarrhea, nausea and vomiting  Negative for blood in stool and constipation  Genitourinary: Negative for dysuria, flank pain, frequency, hematuria and urgency  Musculoskeletal: Positive for myalgias  Negative for gait problem, joint swelling, neck pain and neck stiffness  Skin: Negative for color change and rash  Neurological: Positive for headaches  Negative for dizziness, seizures, weakness and light-headedness  Physical Exam  Physical Exam  Vitals signs and nursing note reviewed  Constitutional:       Appearance: He is well-developed  He is ill-appearing and diaphoretic  HENT:      Head: Normocephalic and atraumatic  Nose: Nose normal    Eyes:      Conjunctiva/sclera: Conjunctivae normal       Pupils: Pupils are equal, round, and reactive to light  Neck:      Musculoskeletal: Normal range of motion and neck supple  Cardiovascular:      Rate and Rhythm: Normal rate and regular rhythm     Pulmonary: Effort: Pulmonary effort is normal       Breath sounds: Normal breath sounds  Abdominal:      General: Abdomen is flat  Palpations: Abdomen is soft  Tenderness: There is abdominal tenderness in the epigastric area and periumbilical area  There is no right CVA tenderness, left CVA tenderness, guarding or rebound  Musculoskeletal: Normal range of motion  Skin:     General: Skin is warm  Capillary Refill: Capillary refill takes less than 2 seconds  Neurological:      Mental Status: He is alert and oriented to person, place, and time           Vital Signs  ED Triage Vitals [03/20/21 1514]   Temperature Pulse Respirations Blood Pressure SpO2   98 7 °F (37 1 °C) 66 18 152/86 99 %      Temp Source Heart Rate Source Patient Position - Orthostatic VS BP Location FiO2 (%)   Oral Monitor Sitting Right arm --      Pain Score       7           Vitals:    03/20/21 1514 03/20/21 1614   BP: 152/86 137/74   Pulse: 66 56   Patient Position - Orthostatic VS: Sitting Lying         Visual Acuity      ED Medications  Medications   sodium chloride (PF) 0 9 % injection 3 mL (has no administration in time range)   sodium chloride 0 9 % bolus 1,000 mL (0 mL Intravenous Stopped 3/20/21 1831)   ondansetron (ZOFRAN) injection 4 mg (4 mg Intravenous Given 3/20/21 1554)   ketorolac (TORADOL) injection 15 mg (15 mg Intravenous Given 3/20/21 1553)   iohexol (OMNIPAQUE) 350 MG/ML injection (MULTI-DOSE) 100 mL (100 mL Intravenous Given 3/20/21 1645)       Diagnostic Studies  Results Reviewed     Procedure Component Value Units Date/Time    UA w Reflex to Microscopic w Reflex to Culture [[id number]]  (Abnormal) Collected: 03/20/21 1611    Lab Status: Final result Specimen: Urine, Clean Catch Updated: 03/20/21 1629     Color, UA Yellow     Clarity, UA Clear     Specific Gravity, UA 1 025     pH, UA 5 5     Leukocytes, UA Negative     Nitrite, UA Negative     Protein, UA Negative mg/dl      Glucose, UA Negative mg/dl      Ketones, UA 40 (2+) mg/dl      Urobilinogen, UA 0 2 E U /dl      Bilirubin, UA Negative     Blood, UA Negative    Mononucleosis screen [[id number]] Collected: 03/20/21 1614    Lab Status: In process Specimen: Blood from Arm, Right Updated: 03/20/21 1620    Lactic Acid [[id number]]  (Abnormal) Collected: 03/20/21 1548    Lab Status: Final result Specimen: Blood from Arm, Right Updated: 03/20/21 1618     LACTIC ACID 0 4 mmol/L     Narrative:      Result may be elevated if tourniquet was used during collection      Comprehensive metabolic panel [[id number]] Collected: 03/20/21 1548    Lab Status: Final result Specimen: Blood from Arm, Right Updated: 03/20/21 1616     Sodium 140 mmol/L      Potassium 3 7 mmol/L      Chloride 102 mmol/L      CO2 26 mmol/L      ANION GAP 12 mmol/L      BUN 11 mg/dL      Creatinine 0 95 mg/dL      Glucose 100 mg/dL      Calcium 9 5 mg/dL      AST 12 U/L      ALT 18 U/L      Alkaline Phosphatase 76 U/L      Total Protein 7 7 g/dL      Albumin 4 2 g/dL      Total Bilirubin 0 41 mg/dL      eGFR 112 ml/min/1 73sq m     Narrative:      [place] guidelines for Chronic Kidney Disease (CKD):     Stage 1 with normal or high GFR (GFR > 90 mL/min/1 73 square meters)    Stage 2 Mild CKD (GFR = 60-89 mL/min/1 73 square meters)    Stage 3A Moderate CKD (GFR = 45-59 mL/min/1 73 square meters)    Stage 3B Moderate CKD (GFR = 30-44 mL/min/1 73 square meters)    Stage 4 Severe CKD (GFR = 15-29 mL/min/1 73 square meters)    Stage 5 End Stage CKD (GFR <15 mL/min/1 73 square meters)  Note: GFR calculation is accurate only with a steady state creatinine    Lipase [[id number]]  (Abnormal) Collected: 03/20/21 1548    Lab Status: Final result Specimen: Blood from Arm, Right Updated: 03/20/21 1616     Lipase 66 u/L     CBC and differential [[id number]]  (Abnormal) Collected: 03/20/21 1548    Lab Status: Final result Specimen: Blood from Arm, Right Updated: 03/20/21 1605     WBC 15 81 Thousand/uL RBC 4 71 Million/uL      Hemoglobin 14 6 g/dL      Hematocrit 41 8 %      MCV 89 fL      MCH 31 0 pg      MCHC 34 9 g/dL      RDW 14 0 %      MPV 9 4 fL      Platelets 871 Thousands/uL      nRBC 0 /100 WBCs      Neutrophils Relative 76 %      Immat GRANS % 1 %      Lymphocytes Relative 18 %      Monocytes Relative 5 %      Eosinophils Relative 0 %      Basophils Relative 0 %      Neutrophils Absolute 12 11 Thousands/µL      Immature Grans Absolute 0 08 Thousand/uL      Lymphocytes Absolute 2 79 Thousands/µL      Monocytes Absolute 0 73 Thousand/µL      Eosinophils Absolute 0 05 Thousand/µL      Basophils Absolute 0 05 Thousands/µL     Procalcitonin with AM Reflex [[id number]] Collected: 03/20/21 1548    Lab Status: In process Specimen: Blood from Arm, Right Updated: 03/20/21 1557    Blood culture #1 [[id number]] Collected: 03/20/21 1548    Lab Status: In process Specimen: Blood from Arm, Right Updated: 03/20/21 1557    Blood culture #2 [[id number]] Collected: 03/20/21 1548    Lab Status: In process Specimen: Blood from Arm, Right Updated: 03/20/21 1557                 CT abdomen pelvis with contrast   Final Result by Florinda Hunter MD (03/20 1708)      No acute intra-abdominal finding  Workstation performed: [id number]         XR chest 1 view portable   Final Result by Florinda Hunter MD (03/20 1605)      No acute cardiopulmonary disease  Workstation performed: [id number]                    Procedures  Procedures         ED Course  ED Course as of Mar 20 2004   Sat Mar 20, 2021   1801 Patient is feeling improved  Patient tolerated PO intake  Repeat abdominal exam without acute findings  MDM  Number of Diagnoses or Management Options  Abdominal pain: new and requires workup  Nausea and vomiting: new and requires workup  URI (upper respiratory infection): new and requires workup  Diagnosis management comments: Physical exam unremarkable    Chest x-ray unremarkable  Lab significant for leukocytosis  UA with 2+ ketones  Mononucleosis pending  CT abdomen and pelvis with contrast without acute findings  Patient is feeling better after IV Zofran and Toradol  Patient had a COVID test today which was negative  I provided patient with strict RTER precautions  I advised patient follow-up with PCP in 24-48 hours  Patient verbalized understanding  Amount and/or Complexity of Data Reviewed  Clinical lab tests: reviewed and ordered  Tests in the radiology section of CPT®: ordered and reviewed  Review and summarize past medical records: yes    Patient Progress  Patient progress: stable      Disposition  Final diagnoses:   URI (upper respiratory infection)   Abdominal pain   Nausea and vomiting     Time reflects when diagnosis was documented in both MDM as applicable and the Disposition within this note     Time User Action Codes Description Comment    3/20/2021  6:02 PM Republic Flank Add [J06 9] URI (upper respiratory infection)     3/20/2021  6:10 PM Republic Flank Add [R10 9] Abdominal pain     3/20/2021  6:10 PM Republic Flank Add [R11 2] Nausea and vomiting       ED Disposition     ED Disposition Condition Date/Time Comment    Discharge Stable Sat Mar 20, 2021  6:10 PM [address] discharge to home/self care              Follow-up Information     Follow up With Specialties Details Why Contact Info Additional [address] Emergency Department Emergency Medicine Go to  If symptoms worsen [address] Emergency Department,  [address], [place], [place], [facility], [address] [place] [address] Nurse Practitioner Schedule an appointment as soon as possible for a visit   [address] [place] [address]   [address] [place] [address] [place] [address]  [phone]             Discharge Medication List as of 3/20/2021  6:11 PM      CONTINUE these medications which have NOT CHANGED    Details   albuterol (PROVENTIL HFA,VENTOLIN HFA) 90 mcg/act inhaler Inhale 2 puffs every 4 (four) hours as needed for wheezing or shortness of breath, Starting Mon 6/3/2019, Normal           No discharge procedures on file      PDMP Review     None          ED Provider  Electronically Signed by           Myron Gaviria PA-C  03/20/21 2004

## 2021-03-20 NOTE — DISCHARGE INSTRUCTIONS
Viral Syndrome   WHAT YOU NEED TO KNOW:   Viral syndrome is a term used for symptoms of an infection caused by a virus  Viruses are spread easily from person to person through the air and on shared items  DISCHARGE INSTRUCTIONS:   Call your local emergency number (911 in the [facility] [facility]) or have someone else call if:   · You have a seizure  · You cannot be woken  · You have chest pain or trouble breathing  Return to the emergency department if:   · You have a stiff neck, a bad headache, and sensitivity to light  · You feel weak, dizzy, or confused  · You stop urinating or urinate a lot less than usual     · You cough up blood or thick yellow or green mucus  · You have severe abdominal pain or your abdomen is larger than usual     Call your doctor if:   · Your symptoms do not get better with treatment or get worse after 3 days  · You have a rash or ear pain  · You have burning when you urinate  · You have questions or concerns about your condition or care  Medicines: You may  need any of the following:  · Acetaminophen  decreases pain and fever  It is available without a doctor's order  Ask how much to take and how often to take it  Follow directions  Read the labels of all other medicines you are using to see if they also contain acetaminophen, or ask your doctor or pharmacist  Acetaminophen can cause liver damage if not taken correctly  Do not use more than 4 grams (4,000 milligrams) total of acetaminophen in one day  · NSAIDs , such as ibuprofen, help decrease swelling, pain, and fever  NSAIDs can cause stomach bleeding or kidney problems in certain people  If you take blood thinner medicine, always ask your healthcare provider if NSAIDs are safe for you  Always read the medicine label and follow directions  · Cold medicine  helps decrease swelling, control a cough, and relieve chest or nasal congestion  · Saline nasal spray  helps decrease nasal congestion       · Take your medicine as directed  Contact your healthcare provider if you think your medicine is not helping or if you have side effects  Tell him of her if you are allergic to any medicine  Keep a list of the medicines, vitamins, and herbs you take  Include the amounts, and when and why you take them  Bring the list or the pill bottles to follow-up visits  Carry your medicine list with you in case of an emergency  Manage your symptoms:   · Drink liquids as directed to prevent dehydration  Ask how much liquid to drink each day and which liquids are best for you  Ask if you should drink an oral rehydration solution (ORS)  An ORS has the right amounts of water, salts, and sugar you need to replace body fluids  This may help prevent dehydration caused by vomiting or diarrhea  Do not drink liquids with caffeine  Liquids with caffeine can make dehydration worse  · Get plenty of rest to help your body heal   Take naps throughout the day  Ask your healthcare provider when you can return to work and your normal activities  · Use a cool mist humidifier to help you breathe easier  Ask your healthcare provider how to use a cool mist humidifier  · Eat honey or use cough drops for a sore throat  Cough drops are available without a doctor's order  Follow directions for taking cough drops  · Do not smoke or be close to anyone who is smoking  Nicotine and other chemicals in cigarettes and cigars can cause lung damage  Smoking can also delay healing  Ask your healthcare provider for information if you currently smoke and need help to quit  E-cigarettes or smokeless tobacco still contain nicotine  Talk to your healthcare provider before you use these products  Prevent the spread of germs:       · Wash your hands often  Wash your hands several times each day  Wash after you use the bathroom, change a child's diaper, and before you prepare or eat food  Use soap and water every time   Rub your soapy hands together, lacing your fingers  Wash the front and back of your hands, and in between your fingers  Use the fingers of one hand to scrub under the fingernails of the other hand  Wash for at least 20 seconds  Rinse with warm, running water for several seconds  Then dry your hands with a clean towel or paper towel  Use hand  that contains alcohol if soap and water are not available  Do not touch your eyes, nose, or mouth without washing your hands first          · Cover a sneeze or cough  Use a tissue that covers your mouth and nose  Throw the tissue away in a trash can right away  Use the bend of your arm if a tissue is not available  Wash your hands well with soap and water or use a hand   · Stay away from others while you are sick  Avoid crowds as much as possible  · Ask about vaccines you may need  Talk to your healthcare provider about your vaccine history  He or she will tell you which vaccines you need, and when to get them  ? Get the influenza (flu) vaccine as soon as recommended each year  The flu vaccine is available starting in September or October  Flu viruses change, so it is important to get a flu vaccine every year  ? Get the pneumonia vaccine if recommended  This vaccine is usually recommended every 5 years  Your provider will tell you when to get this vaccine, if needed  Follow up with your doctor as directed:  Write down your questions so you remember to ask them during your visits  © Copyright [address] Information is for End User's use only and may not be sold, redistributed or otherwise used for commercial purposes  All illustrations and images included in CareNotes® are the copyrighted property of A D A M , Inc  or [facility] Zelda Godwin   The above information is an  only  It is not intended as medical advice for individual conditions or treatments   Talk to your doctor, nurse or pharmacist before following any medical regimen to see if it is safe and effective for you

## 2021-03-20 NOTE — Clinical Note
Kvng Hernandez was seen and treated in our emergency department on 3/20/2021  Diagnosis:     Queen Ivis  may return to work on return date  He may return on this date: 03/24/2021         If you have any questions or concerns, please don't hesitate to call        Fei Burden PA-C    ______________________________           _______________          _______________  Hospital Representative                              Date                                Time

## 2021-03-21 LAB — PROCALCITONIN SERPL-MCNC: <0.05 NG/ML

## 2021-03-22 LAB — HETEROPH AB SER QL: NEGATIVE

## 2021-03-26 LAB
BACTERIA BLD CULT: NORMAL
BACTERIA BLD CULT: NORMAL

## 2022-01-26 ENCOUNTER — HOSPITAL ENCOUNTER (EMERGENCY)
Facility: HOSPITAL | Age: 22
Discharge: HOME | End: 2022-01-26
Attending: EMERGENCY MEDICINE
Payer: COMMERCIAL

## 2022-01-26 VITALS
SYSTOLIC BLOOD PRESSURE: 160 MMHG | TEMPERATURE: 98.4 F | OXYGEN SATURATION: 100 % | RESPIRATION RATE: 16 BRPM | DIASTOLIC BLOOD PRESSURE: 86 MMHG | HEART RATE: 69 BPM

## 2022-01-26 DIAGNOSIS — Z20.822 SUSPECTED COVID-19 VIRUS INFECTION: Primary | ICD-10-CM

## 2022-01-26 PROCEDURE — 87636 SARSCOV2 & INF A&B AMP PRB: CPT | Performed by: PHYSICIAN ASSISTANT

## 2022-01-26 PROCEDURE — 99283 EMERGENCY DEPT VISIT LOW MDM: CPT

## 2022-01-26 PROCEDURE — 99284 EMERGENCY DEPT VISIT MOD MDM: CPT | Performed by: PHYSICIAN ASSISTANT

## 2022-01-27 LAB
FLUAV RNA RESP QL NAA+PROBE: NEGATIVE
FLUBV RNA RESP QL NAA+PROBE: NEGATIVE
SARS-COV-2 RNA RESP QL NAA+PROBE: POSITIVE

## 2022-01-27 NOTE — ED PROVIDER NOTES
History  Chief Complaint   Patient presents with    Sore Throat     Patient comes in reporting recent exposure to covid  C/o HA X2 days and sore throat  States he was tested 2 days ago and test came back inconclusive  Denies Dizziness/ CP/SOB +fatigue     Patient is a 57-year-old male presenting to the emergency room for evaluation of sore throat and mild headache  He states both his parents tested positive for COVID  He states he is not vaccinated for COVID  He states his girlfriend is pregnant, and he needs a COVID test to ensure it is negative so he can see her  He denies any chest pain, shortness of breath, dizziness, headache, fever, chills  History provided by:  Patient   used: No        Prior to Admission Medications   Prescriptions Last Dose Informant Patient Reported? Taking? albuterol (PROVENTIL HFA,VENTOLIN HFA) 90 mcg/act inhaler Not Taking at Unknown time  No No   Sig: Inhale 2 puffs every 4 (four) hours as needed for wheezing or shortness of breath   Patient not taking: Reported on 7/9/2020      Facility-Administered Medications: None       History reviewed  No pertinent past medical history  Past Surgical History:   Procedure Laterality Date    HAND FRACTURE REPAIR Right        Family History   Problem Relation Age of Onset    Arthritis Maternal Grandmother      I have reviewed and agree with the history as documented  E-Cigarette/Vaping     E-Cigarette/Vaping Substances     Social History     Tobacco Use    Smoking status: Never Smoker    Smokeless tobacco: Never Used   Substance Use Topics    Alcohol use: Yes     Comment: occasional    Drug use: Yes     Types: Marijuana       Review of Systems   Constitutional: Negative for chills and fever  HENT: Positive for sore throat  Negative for ear pain  Eyes: Negative for pain and visual disturbance  Respiratory: Negative for cough and shortness of breath      Cardiovascular: Negative for chest pain and palpitations  Gastrointestinal: Negative for abdominal pain and vomiting  Genitourinary: Negative for dysuria and hematuria  Musculoskeletal: Negative for arthralgias and back pain  Skin: Negative for color change and rash  Neurological: Positive for headaches  Negative for seizures and syncope  All other systems reviewed and are negative  Physical Exam  Physical Exam  Vitals and nursing note reviewed  Constitutional:       Appearance: He is well-developed  HENT:      Head: Normocephalic and atraumatic  Eyes:      Conjunctiva/sclera: Conjunctivae normal    Cardiovascular:      Rate and Rhythm: Normal rate and regular rhythm  Heart sounds: No murmur heard  Pulmonary:      Effort: Pulmonary effort is normal  No respiratory distress  Breath sounds: Normal breath sounds  Abdominal:      Palpations: Abdomen is soft  Tenderness: There is no abdominal tenderness  Musculoskeletal:      Cervical back: Neck supple  Skin:     General: Skin is warm and dry  Neurological:      Mental Status: He is alert  Vital Signs  ED Triage Vitals [01/26/22 1955]   Temperature Pulse Respirations Blood Pressure SpO2   98 4 °F (36 9 °C) 69 16 160/86 100 %      Temp Source Heart Rate Source Patient Position - Orthostatic VS BP Location FiO2 (%)   Oral Monitor Lying Left arm --      Pain Score       --           Vitals:    01/26/22 1955   BP: 160/86   Pulse: 69   Patient Position - Orthostatic VS: Lying         Visual Acuity  Visual Acuity      Most Recent Value   L Pupil Size (mm) 3   R Pupil Size (mm) 3          ED Medications  Medications - No data to display    Diagnostic Studies  Results Reviewed     Procedure Component Value Units Date/Time    COVID/FLU [[id number]] Collected: 01/26/22 2045    Lab Status:  In process Specimen: Nares from Nose Updated: 01/26/22 2058                 No orders to display              MDM  Number of Diagnoses or Management Options  Suspected COVID-19 virus infection  Diagnosis management comments: Patient is a 42-year-old male presenting to the emergency room for COVID test   He has a mild sore throat and headache  Exam is normal   Patient had a COVID swab and was discharged home  Advised to quarantine until results come back  Advised return to the ER if anything acutely changes  Patient Progress  Patient progress: stable      Disposition  Final diagnoses:   Suspected COVID-19 virus infection     Time reflects when diagnosis was documented in both MDM as applicable and the Disposition within this note     Time User Action Codes Description Comment    1/26/2022  8:42 PM Bobby Lopez Add [Z20 822] Suspected COVID-19 virus infection       ED Disposition     ED Disposition Condition Date/Time Comment    Discharge Stable Wed Jan 26, 2022  8:42 PM Antony Cifuentes discharge to home/self care  Follow-up Information     Follow up With Specialties Details Why Contact Info Additional Information    [address] Emergency Department Emergency Medicine  If symptoms worsen [address] Emergency Department,  [address], [place], [place], [address]          Discharge Medication List as of 1/26/2022  8:43 PM      CONTINUE these medications which have NOT CHANGED    Details   albuterol (PROVENTIL HFA,VENTOLIN HFA) 90 mcg/act inhaler Inhale 2 puffs every 4 (four) hours as needed for wheezing or shortness of breath, Starting Mon 6/3/2019, Normal             No discharge procedures on file      PDMP Review     None          ED Provider  Electronically Signed by           Haydee Aquino PA-C  01/27/22 6919

## 2023-06-13 ENCOUNTER — OFFICE VISIT (OUTPATIENT)
Dept: FAMILY MEDICINE CLINIC | Facility: CLINIC | Age: 23
End: 2023-06-13
Payer: COMMERCIAL

## 2023-06-13 VITALS
HEART RATE: 87 BPM | DIASTOLIC BLOOD PRESSURE: 70 MMHG | OXYGEN SATURATION: 98 % | SYSTOLIC BLOOD PRESSURE: 140 MMHG | BODY MASS INDEX: 37.47 KG/M2 | WEIGHT: 292 LBS | RESPIRATION RATE: 16 BRPM | HEIGHT: 74 IN

## 2023-06-13 DIAGNOSIS — Z13.29 SCREENING FOR THYROID DISORDER: ICD-10-CM

## 2023-06-13 DIAGNOSIS — F41.1 GAD (GENERALIZED ANXIETY DISORDER): ICD-10-CM

## 2023-06-13 DIAGNOSIS — L70.0 ACNE VULGARIS: ICD-10-CM

## 2023-06-13 DIAGNOSIS — Z13.1 SCREENING FOR DIABETES MELLITUS: ICD-10-CM

## 2023-06-13 DIAGNOSIS — Z13.220 SCREENING CHOLESTEROL LEVEL: ICD-10-CM

## 2023-06-13 DIAGNOSIS — Z63.4 BEREAVEMENT: ICD-10-CM

## 2023-06-13 DIAGNOSIS — Z00.00 ENCOUNTER FOR SCREENING AND PREVENTATIVE CARE: Primary | ICD-10-CM

## 2023-06-13 DIAGNOSIS — R23.4 OILY SKIN: ICD-10-CM

## 2023-06-13 DIAGNOSIS — Z13.0 SCREENING FOR DEFICIENCY ANEMIA: ICD-10-CM

## 2023-06-13 DIAGNOSIS — F32.1 MODERATE MAJOR DEPRESSION (HCC): ICD-10-CM

## 2023-06-13 PROCEDURE — 99204 OFFICE O/P NEW MOD 45 MIN: CPT | Performed by: FAMILY MEDICINE

## 2023-06-13 RX ORDER — ADAPALENE 0.1 G/100G
CREAM TOPICAL
Qty: 45 G | Refills: 0 | Status: SHIPPED | OUTPATIENT
Start: 2023-06-13

## 2023-06-13 SDOH — SOCIAL STABILITY - SOCIAL INSECURITY: DISSAPEARANCE AND DEATH OF FAMILY MEMBER: Z63.4

## 2023-06-13 NOTE — PROGRESS NOTES
New Patient Outpatient Note    HPI:     Patricio Babin, 20 y o  male  presents today as a new patient and to establish care  The apptient presents to establish and throughout the visit he ainsley up severe concerns including weight, mood, and acne  He did not expand too much on the mental health side of things  He recently went through a stint of drinking, more than 5-6 drinks/shot a night, typically of tequila  He has been able to discontinue this practice over the last two weeks, and by the end of the visit he was open to the thought of therapy  We also discussed recent increase in oily skin and acne  He is currently using multiple facial  that contain salicylic acid and benzyl chloride  He has not attempted differin or Retin-A cream   We reviewed the importance of avoiding use and exposure around anyone whom may be pregnant  Lastly, he states that this is the heaviest he has been and is interested in decreasing  He has been having trouble despite watching diet a little bit more closely  He denies any recent exercise  Family Hx  UTD in chart    History reviewed  No pertinent past medical history  Past Surgical History:   Procedure Laterality Date   • HAND FRACTURE REPAIR Right    • WISDOM TOOTH EXTRACTION            Current Outpatient Medications:   •  adapalene (DIFFERIN) 0 1 % cream, Apply topically daily at bedtime, Disp: 45 g, Rfl: 0     SOCIAL:   Rent/Own: Parents, own  Currently living with: mom, dad  Stable food: Yes  Safe At Home: Yes  Hobbies: None  Profession/ employment:      Restriction to medical procedures: none    SEXUAL HISTORY:   Preference:  Women  Sexually Active: yes  Birth Control: none    Psychological History  Psychiatric history: None  History of inpatient:  None  Current Therapy/ Provider:  none  Current Medications:  None    Substance History  Smoking: None  Alcohol Use: prior use excessive, 2 weeks  Substance use: marijuana    ROS:   Review of Systems   Constitutional: Negative for chills, fever and unexpected weight change  HENT: Negative for congestion, ear discharge, ear pain, hearing loss, postnasal drip, rhinorrhea, sinus pressure, sinus pain and sore throat  Eyes: Negative for visual disturbance  Respiratory: Negative for cough, chest tightness and shortness of breath  Cardiovascular: Negative for chest pain and palpitations  Gastrointestinal: Positive for abdominal distention  Negative for abdominal pain, blood in stool, constipation, diarrhea, nausea and vomiting  Genitourinary: Negative for dysuria and frequency  Skin: Negative for rash and wound  Allergic/Immunologic: Negative for environmental allergies  Neurological: Negative for dizziness, light-headedness and headaches  Psychiatric/Behavioral: Negative for self-injury and suicidal ideas  OBJECTIVE  Vitals:    06/13/23 1744   BP: 140/70   Pulse: 87   Resp: 16   SpO2: 98%        Physical Exam  Constitutional:       General: He is not in acute distress  Appearance: Normal appearance  He is obese  He is not ill-appearing, toxic-appearing or diaphoretic  HENT:      Head: Normocephalic and atraumatic  Right Ear: Tympanic membrane, ear canal and external ear normal  There is no impacted cerumen  Left Ear: Tympanic membrane, ear canal and external ear normal  There is no impacted cerumen  Ears:      Comments: Mild amount of cerumen bilaterally  Nose: Nose normal  No congestion or rhinorrhea  Mouth/Throat:      Mouth: Mucous membranes are moist       Pharynx: Oropharynx is clear  No oropharyngeal exudate or posterior oropharyngeal erythema  Eyes:      General:         Right eye: No discharge  Left eye: No discharge  Extraocular Movements: Extraocular movements intact  Pupils: Pupils are equal, round, and reactive to light        Comments: Increased injection, vascularity   Cardiovascular: Rate and Rhythm: Normal rate and regular rhythm  Heart sounds: Normal heart sounds  No murmur heard  No friction rub  No gallop  Pulmonary:      Effort: Pulmonary effort is normal  No respiratory distress  Breath sounds: Normal breath sounds  No stridor  No wheezing, rhonchi or rales  Abdominal:      General: Bowel sounds are normal  There is no distension  Palpations: Abdomen is soft  Tenderness: There is no abdominal tenderness  Musculoskeletal:      Cervical back: Neck supple  No tenderness  Lymphadenopathy:      Cervical: No cervical adenopathy  Skin:     General: Skin is warm  Capillary Refill: Capillary refill takes less than 2 seconds  Neurological:      Mental Status: He is alert  Sensory: No sensory deficit ( light touch present in all 4 extremities)  Motor: No weakness ( 5/5 in all 4 extremities)  Deep Tendon Reflexes: Reflexes normal ( 2/4, intact, C5, C6, L4, S1)  ASSESSMENT AND PLAN   Raeann Anali was seen today for establish care  Diagnoses and all orders for this visit:    Encounter for screening and preventative care  Screening for deficiency anemia  Screening for diabetes mellitus  Screening for thyroid disorder  Screening cholesterol level  Baseline labs were obtained to review for diabetes, cell lines, cholesterol, thyroid function, and kidney/liver function  He has a significant BMI at his age and we will need to evaluate labs for possible options for treatment/ need if there is presence of diabetes or elevated cholesterol  His blood pressure was abnormal today  -     Comprehensive metabolic panel; Future  -     Lipid panel; Future  -     TSH, 3rd generation with Free T4 reflex; Future  -     CBC and differential; Future    TRISTIAN (generalized anxiety disorder)  Bereavement  Moderate major depression ([facility] [address] )  Patient did not open up significantly during visit    By the end he was interested in the discussion of talking with someone or therapy  Therefore order placed patient to decide if interested in further therapy  -     Ambulatory Referral to Psychology; Future    Acne vulgaris  Oily skin  Patient has new onset of increased oily skin and acne  He is already using a benzyl chloride solution, I have recommended continuing this in addition to Differin that was ordered for patient  We did review the improtance of avoiding any exposure to significant other who may be pregnant  This may have detrimental effect on baby if transferred  -     adapalene (DIFFERIN) 0 1 % cream; Apply topically daily at bedtime       Elevated Blood Pressure Reading   Patient initially had a systoic value of 150, retest was 140  He does note some anxiety  Will follow up in 3 weeks  If still elevated recommend medication management, likely with diovan or nifedipine         Anahi Anaya DO  [facility]  6/14/2023 7:41 AM

## 2023-06-13 NOTE — PATIENT INSTRUCTIONS
Please make sure you are fasting for labs  No food or drink except for water for 8 to 12 hours before  I have sent over a Retin-A cream   It is very important you do not use this around any pregnant individuals  If absorbed by pregnant individuals it can affect baby  If you know that you are going to be around your significant other do not put it on until you are not having any further interaction  Please continue to attempt to lose weight  If you having trouble with this I can help you with that as well  We do have some medications and things that come out recently that can be very helpful  I do think that she will be able to lose some by yourself though so keep up the good work with watching her diet and trying to exercise

## 2023-08-22 ENCOUNTER — OFFICE VISIT (OUTPATIENT)
Dept: FAMILY MEDICINE CLINIC | Facility: CLINIC | Age: 23
End: 2023-08-22
Payer: COMMERCIAL

## 2023-08-22 VITALS
RESPIRATION RATE: 16 BRPM | BODY MASS INDEX: 37.47 KG/M2 | WEIGHT: 292 LBS | OXYGEN SATURATION: 99 % | SYSTOLIC BLOOD PRESSURE: 140 MMHG | HEIGHT: 74 IN | HEART RATE: 76 BPM | DIASTOLIC BLOOD PRESSURE: 78 MMHG

## 2023-08-22 DIAGNOSIS — L29.9 SCALP PRURITUS: ICD-10-CM

## 2023-08-22 DIAGNOSIS — B35.0 TINEA CAPITIS: Primary | ICD-10-CM

## 2023-08-22 DIAGNOSIS — F32.1 MODERATE MAJOR DEPRESSION (HCC): ICD-10-CM

## 2023-08-22 DIAGNOSIS — L21.0 CRADLE CAP: ICD-10-CM

## 2023-08-22 DIAGNOSIS — F41.1 GAD (GENERALIZED ANXIETY DISORDER): ICD-10-CM

## 2023-08-22 DIAGNOSIS — L70.0 ACNE VULGARIS: ICD-10-CM

## 2023-08-22 DIAGNOSIS — Z63.4 BEREAVEMENT: ICD-10-CM

## 2023-08-22 PROCEDURE — 99214 OFFICE O/P EST MOD 30 MIN: CPT | Performed by: FAMILY MEDICINE

## 2023-08-22 RX ORDER — KETOCONAZOLE 20 MG/ML
1 SHAMPOO TOPICAL 2 TIMES WEEKLY
Qty: 120 ML | Refills: 1 | Status: SHIPPED | OUTPATIENT
Start: 2023-08-24

## 2023-08-22 SDOH — SOCIAL STABILITY - SOCIAL INSECURITY: DISSAPEARANCE AND DEATH OF FAMILY MEMBER: Z63.4

## 2023-08-22 NOTE — PATIENT INSTRUCTIONS
Please attempt the ketoconazole shampoo in the scalp. I have also placed referral to dermatology. Please attempt to get some blood pressure over the next two weeks. I will call you for follow up. Please if you can get a blood pressure cuff make sure it will fit your arm. Otherwise you can attempt the machines at [employer]/ any drug store.

## 2023-08-23 ENCOUNTER — APPOINTMENT (OUTPATIENT)
Dept: LAB | Facility: CLINIC | Age: 23
End: 2023-08-23
Payer: COMMERCIAL

## 2023-08-23 DIAGNOSIS — Z00.00 ENCOUNTER FOR SCREENING AND PREVENTATIVE CARE: Primary | ICD-10-CM

## 2023-08-23 DIAGNOSIS — Z13.1 SCREENING FOR DIABETES MELLITUS: ICD-10-CM

## 2023-08-23 LAB
ALBUMIN SERPL BCP-MCNC: 4.6 G/DL (ref 3.5–5)
ALP SERPL-CCNC: 54 U/L (ref 34–104)
ALT SERPL W P-5'-P-CCNC: 78 U/L (ref 7–52)
ANION GAP SERPL CALCULATED.3IONS-SCNC: 9 MMOL/L
AST SERPL W P-5'-P-CCNC: 77 U/L (ref 13–39)
BASOPHILS # BLD AUTO: 0.04 THOUSANDS/ÂΜL (ref 0–0.1)
BASOPHILS NFR BLD AUTO: 0 % (ref 0–1)
BILIRUB SERPL-MCNC: 0.42 MG/DL (ref 0.2–1)
BUN SERPL-MCNC: 12 MG/DL (ref 5–25)
CALCIUM SERPL-MCNC: 9.9 MG/DL (ref 8.4–10.2)
CHLORIDE SERPL-SCNC: 105 MMOL/L (ref 96–108)
CHOLEST SERPL-MCNC: 152 MG/DL
CO2 SERPL-SCNC: 24 MMOL/L (ref 21–32)
CREAT SERPL-MCNC: 0.9 MG/DL (ref 0.6–1.3)
EOSINOPHIL # BLD AUTO: 0.1 THOUSAND/ÂΜL (ref 0–0.61)
EOSINOPHIL NFR BLD AUTO: 1 % (ref 0–6)
ERYTHROCYTE [DISTWIDTH] IN BLOOD BY AUTOMATED COUNT: 13.9 % (ref 11.6–15.1)
GFR SERPL CREATININE-BSD FRML MDRD: 118 ML/MIN/1.73SQ M
GLUCOSE P FAST SERPL-MCNC: 102 MG/DL (ref 65–99)
HCT VFR BLD AUTO: 42.9 % (ref 36.5–49.3)
HDLC SERPL-MCNC: 72 MG/DL
HGB BLD-MCNC: 14.7 G/DL (ref 12–17)
IMM GRANULOCYTES # BLD AUTO: 0.04 THOUSAND/UL (ref 0–0.2)
IMM GRANULOCYTES NFR BLD AUTO: 0 % (ref 0–2)
LDLC SERPL CALC-MCNC: 71 MG/DL (ref 0–100)
LYMPHOCYTES # BLD AUTO: 3.42 THOUSANDS/ÂΜL (ref 0.6–4.47)
LYMPHOCYTES NFR BLD AUTO: 37 % (ref 14–44)
MCH RBC QN AUTO: 31.1 PG (ref 26.8–34.3)
MCHC RBC AUTO-ENTMCNC: 34.3 G/DL (ref 31.4–37.4)
MCV RBC AUTO: 91 FL (ref 82–98)
MONOCYTES # BLD AUTO: 0.56 THOUSAND/ÂΜL (ref 0.17–1.22)
MONOCYTES NFR BLD AUTO: 6 % (ref 4–12)
NEUTROPHILS # BLD AUTO: 4.98 THOUSANDS/ÂΜL (ref 1.85–7.62)
NEUTS SEG NFR BLD AUTO: 56 % (ref 43–75)
NONHDLC SERPL-MCNC: 80 MG/DL
NRBC BLD AUTO-RTO: 0 /100 WBCS
PLATELET # BLD AUTO: 282 THOUSANDS/UL (ref 149–390)
PMV BLD AUTO: 10.2 FL (ref 8.9–12.7)
POTASSIUM SERPL-SCNC: 4.1 MMOL/L (ref 3.5–5.3)
PROT SERPL-MCNC: 7.4 G/DL (ref 6.4–8.4)
RBC # BLD AUTO: 4.73 MILLION/UL (ref 3.88–5.62)
SODIUM SERPL-SCNC: 138 MMOL/L (ref 135–147)
TRIGL SERPL-MCNC: 43 MG/DL
TSH SERPL DL<=0.05 MIU/L-ACNC: 0.79 UIU/ML (ref 0.45–4.5)
WBC # BLD AUTO: 9.14 THOUSAND/UL (ref 4.31–10.16)

## 2023-08-23 PROCEDURE — 85025 COMPLETE CBC W/AUTO DIFF WBC: CPT

## 2023-08-23 PROCEDURE — 84443 ASSAY THYROID STIM HORMONE: CPT

## 2023-08-23 PROCEDURE — 80053 COMPREHEN METABOLIC PANEL: CPT

## 2023-08-23 PROCEDURE — 80061 LIPID PANEL: CPT

## 2023-08-23 PROCEDURE — 36415 COLL VENOUS BLD VENIPUNCTURE: CPT

## 2023-08-23 NOTE — PROGRESS NOTES
Outpatient Note- Follow up     HPI:     Aaron Murguia , 20 y.o. male  presents today for follow up of chronic issues. Previously the patient presented with acne, MMD, bereavement, TRISTIAN, and elevated blood pressure. The paitent continues to look for a psychology provider/therapist. He has been hesitant to reach out to locations in the area. I stress the importance of this since he is not interested in medication or further discussing with me. He was told to first call insurance and get a list.  Then get on multiple wait lists since there is a shortage of providers in the area. We dicussed his acne and skin care. He has not used Differin since the patient was told not to use it near his wife that is pregnant. This was due to the Retin-A part of the medication, and its possible effect on baby. He avoided it since he has been staying longer with his significant other then prior. Lastly, he has not been able to get other blood pressures at home since the last visit. He has continued elevations in blood pressure in the office. The patient likely using marijuana to help with doctor visits already, white coat syndrome may be present. He presents today to review changes in his hair and scalp. There is thickened skin similar to cradle cap. He has limited washing of hair due to  His preferred hairstyle of dreadlocks. He had another individual have similar issues and they received clobetasol foam and ketoconazole shampoo. Patient denies chest pain, shortness of breath, nausea, vomiting, diarrhea, constipation, lightheadedness, dizziness. He denies any suicidal or homicidal ideation. No past medical history on file. ROS:   Review of Systems   See HPI    OBJECTIVE  Vitals:    08/22/23 1750   BP: 140/78   Pulse: 76   Resp: 16   SpO2: 99%        Physical Exam  Constitutional:       General: He is not in acute distress. Appearance: Normal appearance. He is obese.  He is not ill-appearing, toxic-appearing or diaphoretic. HENT:      Head: Normocephalic and atraumatic. Comments: In area of scalp under areas that are associated with dread there is thickened skin and flaking. It has significant thickness. Mouth/Throat:      Mouth: Mucous membranes are moist.      Pharynx: Oropharynx is clear. Eyes:      General:         Right eye: No discharge. Left eye: No discharge. Conjunctiva/sclera: Conjunctivae normal.      Pupils: Pupils are equal, round, and reactive to light. Cardiovascular:      Rate and Rhythm: Normal rate and regular rhythm. Heart sounds: Normal heart sounds. No murmur heard. No friction rub. No gallop. Pulmonary:      Effort: Pulmonary effort is normal. No respiratory distress. Breath sounds: Normal breath sounds. No stridor. No wheezing, rhonchi or rales. Abdominal:      General: Bowel sounds are normal.      Palpations: Abdomen is soft. Skin:     General: Skin is warm. Capillary Refill: Capillary refill takes less than 2 seconds. Neurological:      Mental Status: He is alert. ASSESSMENT AND PLAN   Jose Francisco Torres was seen today for follow-up. Diagnoses and all orders for this visit:    Tinea capitis  Cradle cap  Scalp pruritus  Patient has evidence of thickened skin and flaking. This is likely a build up of dead skin and possibly an underlying fungal infection. He was referred to dermatology for I am not comfortable prewithscribing the clobetasol foam, if the dermatologist feel it is appropriate I will defer to their recommendations. Ketoconazole shampoo recommended. -     Ambulatory Referral to Dermatology; Future  -     ketoconazole (NIZORAL) 2 % shampoo;  Apply 1 Application topically 2 (two) times a week    TRISTIAN (generalized anxiety disorder)  Moderate major depression ([address])  Bereavement  Patient hesitant to obtain therapist.  I advocated for him to start the process as soon as possible since baby is coming in January and he should have one secured by then if he starts now. He will need to be mentally and emotionally available when baby comes. Acne vulgaris  Improving with regular facial wash and stripping of oils from the skin. [email or web address] is not using Differin due to possible effect on significant other.   He did not even  cream.        Kj Watts,   [facility]  8/23/2023 7:35 AM

## 2023-09-04 ENCOUNTER — PATIENT MESSAGE (OUTPATIENT)
Dept: FAMILY MEDICINE CLINIC | Facility: CLINIC | Age: 23
End: 2023-09-04

## 2023-09-04 DIAGNOSIS — R74.01 ELEVATED AST (SGOT): Primary | ICD-10-CM

## 2023-09-04 DIAGNOSIS — R79.89 ABNORMAL LFTS: ICD-10-CM

## 2023-09-04 DIAGNOSIS — R74.01 ELEVATED ALT MEASUREMENT: ICD-10-CM

## 2023-09-04 DIAGNOSIS — R73.01 ELEVATED FASTING BLOOD SUGAR: ICD-10-CM

## 2023-09-04 NOTE — PROGRESS NOTES
Patient requires follow up of labs for elevated LFTs and fasting sugars. Will order abnormal liver panel and a1c.       Martha Tillman DO  [facility]  9/4/2023 3:31 PM

## 2023-09-14 ENCOUNTER — TELEPHONE (OUTPATIENT)
Dept: FAMILY MEDICINE CLINIC | Facility: CLINIC | Age: 23
End: 2023-09-14

## 2023-10-04 ENCOUNTER — TELEPHONE (OUTPATIENT)
Age: 23
End: 2023-10-04

## 2023-10-04 ENCOUNTER — OFFICE VISIT (OUTPATIENT)
Dept: FAMILY MEDICINE CLINIC | Facility: CLINIC | Age: 23
End: 2023-10-04
Payer: COMMERCIAL

## 2023-10-04 VITALS
OXYGEN SATURATION: 98 % | SYSTOLIC BLOOD PRESSURE: 138 MMHG | RESPIRATION RATE: 16 BRPM | WEIGHT: 295 LBS | HEIGHT: 74 IN | HEART RATE: 86 BPM | DIASTOLIC BLOOD PRESSURE: 82 MMHG | BODY MASS INDEX: 37.86 KG/M2

## 2023-10-04 DIAGNOSIS — F32.1 MODERATE MAJOR DEPRESSION (HCC): ICD-10-CM

## 2023-10-04 DIAGNOSIS — R03.0 BORDERLINE HYPERTENSION: Primary | ICD-10-CM

## 2023-10-04 DIAGNOSIS — B35.0 TINEA CAPITIS: ICD-10-CM

## 2023-10-04 DIAGNOSIS — L29.9 SCALP PRURITUS: ICD-10-CM

## 2023-10-04 DIAGNOSIS — F41.1 GAD (GENERALIZED ANXIETY DISORDER): ICD-10-CM

## 2023-10-04 DIAGNOSIS — R03.0 ELEVATED BLOOD PRESSURE READING: ICD-10-CM

## 2023-10-04 PROCEDURE — 99214 OFFICE O/P EST MOD 30 MIN: CPT | Performed by: FAMILY MEDICINE

## 2023-10-04 RX ORDER — KETOCONAZOLE 20 MG/ML
1 SHAMPOO TOPICAL 2 TIMES WEEKLY
Qty: 120 ML | Refills: 2 | Status: SHIPPED | OUTPATIENT
Start: 2023-10-05

## 2023-10-04 NOTE — PROGRESS NOTES
Outpatient Note- Follow up     HPI:     Thomas Jackson , 20 y.o. male  presents today for chronic medical conditions. The patient has a history of borderline hypertension, tinea capitis, scalp pruritus, and moderate depression/TRISTIAN. The patient has not been able to take his blood pressure at home as we discussed previously, therefore blood pressure on presentation is 138/82. He does admit to having significant improvement with using the ketoconazole shampoo. He has decreased pruritus and burden of thickened skin on the scalp from tinea. He denies any chest pain, shortness of breath, nausea, vomiting, lightheadedness, dizziness, blurry vision, headaches. His significant other is 6 months pregnant and he is doing his best to take care of his 1 child at home, work, and take care of his health as well. Overall his mental health has remained stable, but he still states that he has underlying anxiety about upcoming child and birth. Denies any suicidal or homicidal ideation. History reviewed. No pertinent past medical history. ROS:   Review of Systems   See HPI. OBJECTIVE  Vitals:    10/04/23 1751   BP: 138/82   Pulse: 86   Resp: 16   SpO2: 98%        Physical Exam  Constitutional:       General: He is not in acute distress. Appearance: Normal appearance. He is obese. He is not ill-appearing or toxic-appearing. HENT:      Head:      Comments: Scalp improved with thickness of skin and build up     Right Ear: Tympanic membrane, ear canal and external ear normal.      Left Ear: Tympanic membrane, ear canal and external ear normal.      Nose: Nose normal. No congestion or rhinorrhea. Mouth/Throat:      Mouth: Mucous membranes are moist.      Pharynx: Oropharynx is clear. No oropharyngeal exudate or posterior oropharyngeal erythema. Eyes:      General:         Right eye: No discharge. Left eye: No discharge. Extraocular Movements: Extraocular movements intact.       Pupils: Pupils are equal, round, and reactive to light. Cardiovascular:      Rate and Rhythm: Normal rate and regular rhythm. Heart sounds: Normal heart sounds. No friction rub. No gallop. Pulmonary:      Effort: Pulmonary effort is normal. No respiratory distress. Breath sounds: Normal breath sounds. No stridor. No wheezing, rhonchi or rales. Abdominal:      General: Bowel sounds are normal. There is no distension. Palpations: Abdomen is soft. Tenderness: There is no abdominal tenderness. Musculoskeletal:      Cervical back: Neck supple. No tenderness. Lymphadenopathy:      Cervical: No cervical adenopathy. Neurological:      Mental Status: He is alert. ASSESSMENT AND PLAN   Norma Randolph was seen today for follow-up. Diagnoses and all orders for this visit:    Borderline hypertension  Elevated blood pressure reading  Systolic blood pressure remains in the borderline region. On presentation 138/82. Recommended he return for office/nurse visit to follow-up for his BP since he cannot take it at home. If he continues to have elevations in his systolic, will consider medication management. Reviewed reduction of salt, increasing exercise, and reducing weight to help with this chronic issue. Tinea capitis  Scalp pruritus  Improving. Patient to continue use ketoconazole shampoo through his hairdresser. There has been a significant decrease in burden. -     ketoconazole (NIZORAL) 2 % shampoo; Apply 1 Application topically 2 (two) times a week    TRISTIAN (generalized anxiety disorder)  Moderate Major depression ([address])  Coping currently. On presentation patient smells like he is using marijuana for self treatment. He is to monitor his mental health and call if he has exacerbation of symptoms.   despite this Dr. Claire Martinez was Dr.  He think he ate he works out of a different office today DO Renato  [facility]  10/5/2023 8:01 AM

## 2023-10-04 NOTE — TELEPHONE ENCOUNTER
Patient didn't get blood work wanted to know if should go to appointment? As per office.  Yes come to the 10/4 appointment

## 2023-10-25 ENCOUNTER — OFFICE VISIT (OUTPATIENT)
Dept: FAMILY MEDICINE CLINIC | Facility: CLINIC | Age: 23
End: 2023-10-25
Payer: COMMERCIAL

## 2023-10-25 VITALS — SYSTOLIC BLOOD PRESSURE: 126 MMHG | DIASTOLIC BLOOD PRESSURE: 78 MMHG

## 2023-10-25 DIAGNOSIS — R03.0 ELEVATED BLOOD PRESSURE READING: Primary | ICD-10-CM

## 2023-10-25 DIAGNOSIS — R03.0 BORDERLINE HYPERTENSION: ICD-10-CM

## 2023-10-25 PROCEDURE — 99211 OFF/OP EST MAY X REQ PHY/QHP: CPT

## 2023-10-25 NOTE — PATIENT INSTRUCTIONS
Patient here for a blood pressure check. Current blood pressure reading is 128/76. Reviewed with Dr. Pawel Rios and everything looks good.

## 2023-10-25 NOTE — PROGRESS NOTES
Patient had blood pressure check and BP of 126/78. Within normal limits. Patient to continue with monitoring intermittently.   Patient to come in if he has any significant symptoms

## 2023-12-20 ENCOUNTER — OFFICE VISIT (OUTPATIENT)
Dept: FAMILY MEDICINE CLINIC | Facility: CLINIC | Age: 23
End: 2023-12-20
Payer: COMMERCIAL

## 2023-12-20 VITALS
RESPIRATION RATE: 16 BRPM | SYSTOLIC BLOOD PRESSURE: 120 MMHG | BODY MASS INDEX: 36.45 KG/M2 | HEART RATE: 113 BPM | HEIGHT: 74 IN | DIASTOLIC BLOOD PRESSURE: 70 MMHG | WEIGHT: 284 LBS | OXYGEN SATURATION: 98 %

## 2023-12-20 DIAGNOSIS — Z23 ENCOUNTER FOR IMMUNIZATION: ICD-10-CM

## 2023-12-20 DIAGNOSIS — Z00.00 ENCOUNTER FOR ANNUAL PHYSICAL EXAM: ICD-10-CM

## 2023-12-20 DIAGNOSIS — M62.838 MUSCLE SPASM: ICD-10-CM

## 2023-12-20 DIAGNOSIS — R25.3 MUSCLE FASCICULATION: ICD-10-CM

## 2023-12-20 DIAGNOSIS — Z00.00 ENCOUNTER FOR SCREENING AND PREVENTATIVE CARE: Primary | ICD-10-CM

## 2023-12-20 PROCEDURE — 99395 PREV VISIT EST AGE 18-39: CPT | Performed by: FAMILY MEDICINE

## 2023-12-20 PROCEDURE — 90472 IMMUNIZATION ADMIN EACH ADD: CPT | Performed by: FAMILY MEDICINE

## 2023-12-20 PROCEDURE — 90686 IIV4 VACC NO PRSV 0.5 ML IM: CPT | Performed by: FAMILY MEDICINE

## 2023-12-20 PROCEDURE — 90715 TDAP VACCINE 7 YRS/> IM: CPT | Performed by: FAMILY MEDICINE

## 2023-12-20 PROCEDURE — 90471 IMMUNIZATION ADMIN: CPT | Performed by: FAMILY MEDICINE

## 2023-12-20 NOTE — PROGRESS NOTES
ADULT ANNUAL PHYSICAL  [facility]    NAME: Judi Jordan  AGE: 23 y.o. SEX: male  : 2000     DATE: 2023     Assessment and Plan:     Problem List Items Addressed This Visit    None  Visit Diagnoses       Encounter for screening and preventative care    -  Primary    Encounter for annual physical exam        Muscle spasm        Relevant Orders    Magnesium    Comprehensive metabolic panel    Muscle fasciculation        Relevant Orders    Magnesium    Comprehensive metabolic panel    Encounter for immunization        Relevant Orders    TDAP VACCINE GREATER THAN OR EQUAL TO 8YO IM (Completed)    influenza vaccine, quadrivalent, 0.5 mL, preservative-free, for adult and pediatric patients 6 mos+ (AFLURIA, FLUARIX, FLULAVAL, FLUZONE) (Completed)        Patient presents today to follow-up for annual physical exam and preventative care.  Overall patient feels that he is doing well.  He has decreased his weight and hopes to continue to reduce his oral intake and increase his exercise.  He has a baby coming in a month and they are anxiously expecting a new baby boy.  He has been having intermittent muscle spasms in his lower extremity, therefore will obtain a magnesium and CMP for further evaluation.  He received his flu and Tdap today due to baby arriving in about 1 month.    Immunizations and preventive care screenings were discussed with patient today. Appropriate education was printed on patient's after visit summary.    Counseling:  Alcohol/drug use: discussed moderation in alcohol intake, the recommendations for healthy alcohol use, and avoidance of illicit drug use.  Dental Health: discussed importance of regular tooth brushing, flossing, and dental visits.  Injury prevention: discussed safety/seat belts, safety helmets, smoke detectors, carbon dioxide detectors, and smoking near bedding or upholstery.  Sexual health: discussed sexually  transmitted diseases, partner selection, use of condoms, avoidance of unintended pregnancy, and contraceptive alternatives.  Exercise: the importance of regular exercise/physical activity was discussed. Recommend exercise 3-5 times per week for at least 30 minutes.     BMI Counseling: Body mass index is 36.46 kg/m². The BMI is above normal. Nutrition recommendations include decreasing portion sizes, decreasing fast food intake and consuming healthier snacks. Exercise recommendations include moderate physical activity 150 minutes/week and exercising 3-5 times per week. Rationale for BMI follow-up plan is due to patient being overweight or obese.     Depression Screening and Follow-up Plan: Patient was screened for depression during today's encounter. They screened negative with a PHQ-2 score of 0.      No follow-ups on file.     Chief Complaint:     Chief Complaint   Patient presents with    Annual Exam      History of Present Illness:     Adult Annual Physical   Patient here for a comprehensive physical exam. The patient reports no problems.  Patient understands that he should be losing weight.  He has been attempting to reduce his calorie intake and although he does not have much time once coming home from work, he attempts to get in some exercise.  He has been using the ketoconazole shampoo when he gets his hair done by his stylist, and has significantly reduced the burden of buildup on his scalp.    Diet and Physical Activity  Diet/Nutrition: well balanced diet, low fat diet, low carb diet, and consuming 3-5 servings of fruits/vegetables daily.   Exercise: no formal exercise and is walking to stay active.      Depression Screening  PHQ-2/9 Depression Screening    Little interest or pleasure in doing things: 0 - not at all  Feeling down, depressed, or hopeless: 0 - not at all  PHQ-2 Score: 0  PHQ-2 Interpretation: Negative depression screen       General Health  Sleep: sleeps well and 6-8 hours .   Hearing: normal  - bilateral.  Vision: vision problems: trouble night driving.  Used have glasses as a child.  .   Dental: no dental visits for >1 year and brushes teeth twice daily.        Health  History of STDs?: no.     Review of Systems:     Review of Systems   Constitutional:  Negative for chills, fever and unexpected weight change.   HENT:  Negative for congestion, ear discharge, ear pain, hearing loss, postnasal drip, rhinorrhea, sinus pressure, sinus pain and sore throat.    Eyes:  Positive for visual disturbance (blurry vision at night).   Respiratory:  Negative for cough, chest tightness and shortness of breath.    Cardiovascular:  Negative for chest pain and palpitations.   Gastrointestinal:  Negative for abdominal pain, blood in stool, constipation, diarrhea, nausea and vomiting.   Genitourinary:  Negative for dysuria and frequency.   Skin:  Negative for rash and wound.   Neurological:  Negative for dizziness, light-headedness and headaches.   Psychiatric/Behavioral:  Negative for self-injury and suicidal ideas.       Past Medical History:     No past medical history on file.   Past Surgical History:     Past Surgical History:   Procedure Laterality Date    HAND FRACTURE REPAIR Right     WISDOM TOOTH EXTRACTION        Social History:     Social History     Socioeconomic History    Marital status: Single     Spouse name: None    Number of children: None    Years of education: None    Highest education level: None   Occupational History    None   Tobacco Use    Smoking status: Never    Smokeless tobacco: Never   Vaping Use    Vaping status: Never Used   Substance and Sexual Activity    Alcohol use: Not Currently     Comment: prior excessive drinking    Drug use: Yes     Types: Marijuana    Sexual activity: Yes     Partners: Female     Birth control/protection: None   Other Topics Concern    None   Social History Narrative    Checked [place]     Social Determinants of Health     Financial Resource Strain: Not on file  "  Food Insecurity: Not on file   Transportation Needs: Not on file   Physical Activity: Not on file   Stress: Not on file   Social Connections: Not on file   Intimate Partner Violence: Not on file   Housing Stability: Not on file      Family History:     Family History   Problem Relation Age of Onset    Anxiety disorder Mother     Heart disease Father     Coronary artery disease Father     Stroke Father     Arthritis Maternal Grandmother     Testicular cancer Neg Hx     Prostate cancer Neg Hx     Colon cancer Neg Hx       Current Medications:     Current Outpatient Medications   Medication Sig Dispense Refill    ketoconazole (NIZORAL) 2 % shampoo Apply 1 Application topically 2 (two) times a week 120 mL 2     No current facility-administered medications for this visit.      Allergies:     No Known Allergies   Physical Exam:     /70   Pulse (!) 113   Resp 16   Ht 6' 2\" (1.88 m)   Wt 129 kg (284 lb)   SpO2 98%   BMI 36.46 kg/m²     Physical Exam  Constitutional:       General: He is not in acute distress.     Appearance: Normal appearance. He is obese. He is not ill-appearing, toxic-appearing or diaphoretic.   HENT:      Head: Normocephalic and atraumatic.      Right Ear: Tympanic membrane, ear canal and external ear normal. There is no impacted cerumen.      Left Ear: Tympanic membrane, ear canal and external ear normal. There is no impacted cerumen.      Nose: Nose normal. No congestion or rhinorrhea.      Mouth/Throat:      Mouth: Mucous membranes are moist.      Pharynx: Oropharynx is clear. No oropharyngeal exudate or posterior oropharyngeal erythema.   Eyes:      General:         Right eye: No discharge.         Left eye: No discharge.      Pupils: Pupils are equal, round, and reactive to light.   Cardiovascular:      Rate and Rhythm: Normal rate and regular rhythm.      Heart sounds: Normal heart sounds. No murmur heard.     No friction rub. No gallop.   Pulmonary:      Effort: Pulmonary effort is " normal. No respiratory distress.      Breath sounds: Normal breath sounds. No stridor. No wheezing, rhonchi or rales.   Abdominal:      General: Bowel sounds are normal. There is no distension.      Palpations: Abdomen is soft.      Tenderness: There is no abdominal tenderness.   Musculoskeletal:         General: No tenderness.      Cervical back: No tenderness.   Lymphadenopathy:      Cervical: No cervical adenopathy.   Skin:     General: Skin is warm.      Capillary Refill: Capillary refill takes less than 2 seconds.   Neurological:      Mental Status: He is alert.      Sensory: No sensory deficit (light touch in all 4 extremities).      Motor: No weakness (5/5 in all 4 extremities).      Deep Tendon Reflexes: Reflexes normal (2/4, C5, C6, L4, S1).          Garcia Hargrove,    [facility]

## 2023-12-21 ENCOUNTER — PATIENT MESSAGE (OUTPATIENT)
Dept: FAMILY MEDICINE CLINIC | Facility: CLINIC | Age: 23
End: 2023-12-21

## 2024-10-28 ENCOUNTER — APPOINTMENT (EMERGENCY)
Dept: RADIOLOGY | Facility: HOSPITAL | Age: 24
End: 2024-10-28
Payer: COMMERCIAL

## 2024-10-28 ENCOUNTER — HOSPITAL ENCOUNTER (EMERGENCY)
Facility: HOSPITAL | Age: 24
Discharge: HOME | End: 2024-10-28
Attending: EMERGENCY MEDICINE
Payer: COMMERCIAL

## 2024-10-28 VITALS
OXYGEN SATURATION: 99 % | WEIGHT: 247.58 LBS | RESPIRATION RATE: 20 BRPM | TEMPERATURE: 97.5 F | BODY MASS INDEX: 31.79 KG/M2 | SYSTOLIC BLOOD PRESSURE: 137 MMHG | HEART RATE: 85 BPM | DIASTOLIC BLOOD PRESSURE: 77 MMHG

## 2024-10-28 DIAGNOSIS — S61.411A SKIN TEAR OF RIGHT HAND WITHOUT COMPLICATION, INITIAL ENCOUNTER: ICD-10-CM

## 2024-10-28 DIAGNOSIS — M79.641 RIGHT HAND PAIN: Primary | ICD-10-CM

## 2024-10-28 PROCEDURE — 73130 X-RAY EXAM OF HAND: CPT

## 2024-10-28 PROCEDURE — 99284 EMERGENCY DEPT VISIT MOD MDM: CPT | Performed by: EMERGENCY MEDICINE

## 2024-10-28 PROCEDURE — 12001 RPR S/N/AX/GEN/TRNK 2.5CM/<: CPT | Performed by: EMERGENCY MEDICINE

## 2024-10-28 PROCEDURE — 99283 EMERGENCY DEPT VISIT LOW MDM: CPT
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
## 2024-11-26 ENCOUNTER — TELEPHONE (OUTPATIENT)
Age: 24
End: 2024-11-26

## 2024-11-26 NOTE — TELEPHONE ENCOUNTER
"Behavioral Health Outpatient Intake Questions    Referred By   :     Please advise interviewee that they need to answer all questions truthfully to allow for best care, and any misrepresentations of information may affect their ability to be seen at this clinic   => Was this discussed? Yes     If Minor Child (under age 18)    Who is/are the legal guardian(s) of the child?     Is there a custody agreement? No     If \"YES\"- Custody orders must be obtained prior to scheduling the first appointment  In addition, Consent to Treatment must be signed by all legal guardians prior to scheduling the first appointment    If \"NO\"- Consent to Treatment must be signed by all legal guardians prior to scheduling the first appointment    Behavioral Health Outpatient Intake History -     Presenting Problem (in patient's own words): Anger management/Stress     Are there any communication barriers for this patient?     No                                               If yes, please describe barriers:  none  If there is a unique situation, please refer to Anderson Estrada/Amber Pires for final determination.    Are you taking any psychiatric medications? No     If \"YES\" -What are they  none      If \"YES\" -Who prescribes?     Has the Patient previously received outpatient Talk Therapy or Medication Management from [facility]  No        If \"YES\"- When, Where and with Whom? no        If \"NO\" -Has Patient received these services elsewhere?       If \"YES\" -When, Where, and with Whom?no    Has the Patient abused alcohol or other substances in the last 6 months ? No  No concerns of substance abuse are reported.     If \"YES\" -What substance, How much, How often?     If illegal substance: Refer to [facility] (for VALERIA) or [facility].   If Alcohol in excess of 10 drinks per week:  Refer to [facility] (for VALERIA) or [facility] Off[facility]    Legal History-     Is this treatment court ordered? No   If \"yes \"send to :  Talk Therapy : Send to Anderson" "Cristy Pires for final determination   Med Management: Send to Dr Cabrales for final determination     Has the Patient been convicted of a felony?  No   If \"Yes\" send to -When, What?  Talk Therapy: Send to Anderson Pires for final determination   Med Management: Send to Dr Cabrales for final determination     ACCEPTED as a patient Yes  If \"Yes\" Appointment Date: 11/29/24    Referred Elsewhere? No  If “Yes” - (Where? Ex: [facility], [facility], [facility], [facility], etc.)       Name of Insurance Co:[facility]  Insurance ID#[id number]   Insurance Phone #  If ins is primary or secondary?Primary  If patient is a minor, parents information such as Name, D.O.B of guarantor.  "

## 2024-11-29 ENCOUNTER — OFFICE VISIT (OUTPATIENT)
Dept: BEHAVIORAL/MENTAL HEALTH CLINIC | Facility: CLINIC | Age: 24
End: 2024-11-29
Payer: COMMERCIAL

## 2024-11-29 DIAGNOSIS — F41.1 GENERALIZED ANXIETY DISORDER: Primary | ICD-10-CM

## 2024-11-29 PROCEDURE — 90791 PSYCH DIAGNOSTIC EVALUATION: CPT | Performed by: COUNSELOR
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
## 2024-12-03 PROBLEM — F41.1 GENERALIZED ANXIETY DISORDER: Status: ACTIVE | Noted: 2024-12-03

## 2024-12-27 ENCOUNTER — SOCIAL WORK (OUTPATIENT)
Dept: BEHAVIORAL/MENTAL HEALTH CLINIC | Facility: CLINIC | Age: 24
End: 2024-12-27
Payer: COMMERCIAL

## 2024-12-27 DIAGNOSIS — F41.1 GENERALIZED ANXIETY DISORDER: Primary | ICD-10-CM

## 2024-12-27 PROCEDURE — 90834 PSYTX W PT 45 MINUTES: CPT | Performed by: COUNSELOR

## 2024-12-27 NOTE — PSYCH
Outpatient Behavioral Health Psychotherapy Treatment Plan    Judi Jordan  2000     Date of Initial Psychotherapy Assessment: ***   Date of Current Treatment Plan: 12/27/24  Treatment Plan Target Date: ***  Treatment Plan Expiration Date: ***    Diagnosis:   No diagnosis found.    Area(s) of Need: ***    Long Term Goal 1 (in the client's own words): ***    Stage of Change: {SL AMB PSYCH STAGE OF CHANGE:45082}    Target Date for completion: ***     Anticipated therapeutic modalities: ***     People identified to complete this goal: ***      Objective 1: (identify the means of measuring success in meeting the objective): ***      Objective 2: (identify the means of measuring success in meeting the objective): ***      Long Term Goal 2 (in the client's own words): ***    Stage of Change: {SL AMB PSYCH STAGE OF CHANGE:48889}    Target Date for completion: ***     Anticipated therapeutic modalities: ***     People identified to complete this goal: ***      Objective 1: (identify the means of measuring success in meeting the objective): ***      Objective 2: (identify the means of measuring success in meeting the objective): ***     Long Term Goal 3 (in the client's own words): ***    Stage of Change: {SL AMB PSYCH STAGE OF CHANGE:73786}    Target Date for completion: ***     Anticipated therapeutic modalities: ***     People identified to complete this goal: ***      Objective 1: (identify the means of measuring success in meeting the objective): ***      Objective 2: (identify the means of measuring success in meeting the objective): ***     I am currently under the care of a [facility] psychiatric provider: {YES/NO:20200}    My [facility] psychiatric provider is: ***    I am currently taking psychiatric medications: {SL AMB TAKING PSYCH MEDS:90634}    I feel that I will be ready for discharge from mental health care when I reach the following (measurable goal/objective): ***    For children and adults who have a  legal guardian:   Has there been any change to custody orders and/or guardianship status? {Yes/No/NA:03655}. If yes, attach updated documentation.    I have {SL AMB PSYCH CREATED/UPDATED:02620} my Crisis Plan and have been offered a copy of this plan    Behavioral Health Treatment Plan [facility] [place]: Diagnosis and Treatment Plan explained to Judi Jordan {acknowledge/does not acknowledge:35150} an understanding of their diagnosis. Judi Jordan {SL AMB PSYCH AGREE/DISAGREE:15826} this treatment plan.    I have been offered a copy of this Treatment Plan. {YES/NO:20200}

## 2024-12-27 NOTE — PSYCH
"Behavioral Health Psychotherapy Progress Note    Psychotherapy Provided: Individual Psychotherapy     No diagnosis found.    Goals addressed in session: Goal 1     DATA: Followed up with Judi arita anxiety related to his work along with his open charges  During this session, this clinician used the following therapeutic modalities: Client-centered Therapy    Substance Abuse was not addressed during this session. If the client is diagnosed with a co-occurring substance use disorder, please indicate any changes in the frequency or amount of use: . Stage of change for addressing substance use diagnoses: No substance use/Not applicable    ASSESSMENT:  Judi Jordan presents with a Euthymic/ normal mood.     his affect is Normal range and intensity, which is congruent, with his mood and the content of the session. The client has made progress on their goals.     Judi Jordan presents with a none risk of suicide, none risk of self-harm, and none risk of harm to others.    For any risk assessment that surpasses a \"low\" rating, a safety plan must be developed.    A safety plan was indicated: yes  If yes, describe in detail N/A    PLAN: Between sessions, Judi Jordan will obtain court info. At the next session, the therapist will use Client-centered Therapy to address self care.    Behavioral Health Treatment Plan and Discharge Planning: Judi Jordan is aware of and agrees to continue to work on their treatment plan. They have identified and are working toward their discharge goals. yes    Depression Follow-up Plan Completed: No    Visit start and stop times:    12/27/24     "

## 2024-12-27 NOTE — BH CRISIS PLAN
Client Name: Judi Jordan       Client YOB: 2000    Jordan-Toby Safety Plan      Creation Date: 12/27/24 Update Date: 12/27/25   Created By: BETTINA Miller Last Updated By: BETTINA Miller      Step 1: Warning Signs:   Warning Signs   anger            Step 2: Internal Coping Strategies:   Internal Coping Strategies   not doing anything            Step 3: People and social settings that provide distraction:   Name Contact Information   none     Places   garage           Step 4: People whom I can ask for help during a crisis:     Patient did not identify any contacts: Yes      Step 5: Professionals or agencies I can contact during a crisis:      Clinican/Agency Name Phone Emergency Contact    Julian Myers, Ph.D.,[facility]        Local Emergency Department Emergency Department Phone Emergency Department Address    [address]          Crisis Phone Numbers:   Suicide Prevention Lifeline: Call or Text  378 Crisis Text Line: Text HOME to 943-615   Please note: Some [facility] do not have a separate number for Child/Adolescent specific crisis. If your county is not listed under Child/Adolescent, please call the adult number for your county      Adult Crisis Numbers: Child/Adolescent Crisis Numbers   [facility]: [phone] [facility]: [phone]   [facility]: [phone] [facility]: [phone]   [facility]: [phone] [place]: [phone]   [facility]: [phone] [place]: [phone]   [facility]: [phone]   [facility]: [phone]   [facility]: [phone]   [facility]: [phone] (daytime) [phone] (after hours, weekends, holidays)      Step 6: Making the environment safer (plan for lethal means safety):   Patient did not identify any lethal methods: Yes     Optional: What is most important to me and worth living for?      [place] Safety Plan. Danielle Ortega and Alphonse MEDEIROS  Toby. Used with permission of the authors.

## 2025-01-10 ENCOUNTER — SOCIAL WORK (OUTPATIENT)
Dept: BEHAVIORAL/MENTAL HEALTH CLINIC | Facility: CLINIC | Age: 25
End: 2025-01-10
Payer: COMMERCIAL

## 2025-01-10 DIAGNOSIS — F41.1 GENERALIZED ANXIETY DISORDER: Primary | ICD-10-CM

## 2025-01-10 DIAGNOSIS — F10.10 ALCOHOL USE DISORDER, MILD, ABUSE: ICD-10-CM

## 2025-01-10 PROCEDURE — 90834 PSYTX W PT 45 MINUTES: CPT | Performed by: COUNSELOR

## 2025-01-10 NOTE — PSYCH
"Behavioral Health Psychotherapy Progress Note    Psychotherapy Provided: Individual Psychotherapy     1. Generalized anxiety disorder        2. Alcohol use disorder, mild, abuse            Goals addressed in session: Goal 1     DATA: Met with Judi and processed his arrest. He presently has 2 separate assault and harassment charges pending. One for an argument with his GF after they had an evening of drinking, and the other towards her father the same nite when they got home and the GF father wanted to take their 1 yr out of the home to his place that evening due to their drinking and behavior. He presently is with Pre- trial services on [facility] and can satisfy the OP recommendation requirement at this location with this clinician who is a [facility]. A copy of his D & A evaluation was provided and will be uploaded to the chart along with a letter that will be sent to pre trial  of his admit to therapy. Client is hoping to get into [facility] to have his record expundged  During this session, this clinician used the following therapeutic modalities: Client-centered Therapy    Substance Abuse was addressed during this session. If the client is diagnosed with a co-occurring substance use disorder, please indicate any changes in the frequency or amount of use:  Stage of change for addressing substance use diagnoses: Contemplation    ASSESSMENT:  Judi Jordan presents with a Euthymic/ normal mood.     his affect is Normal range and intensity, which is congruent, with his mood and the content of the session. The client has made progress on their goals.    Judi Jordan presents with a none risk of suicide, none risk of self-harm, and none risk of harm to others.    For any risk assessment that surpasses a \"low\" rating, a safety plan must be developed.    A safety plan was indicated: no  If yes, describe in detail N/A    PLAN: Between sessions, Judi Jordan will attend next session with approach to exploring his " ETOH use. At the next session, the therapist will use Client-centered Therapy to address self care.    Behavioral Health Treatment Plan and Discharge Planning: Judi Jordan is aware of and agrees to continue to work on their treatment plan. They have identified and are working toward their discharge goals. yes    Depression Follow-up Plan Completed: No    Visit start and stop times:    01/10/25  Start Time: 0804  Stop Time: 0854  Total Visit Time: 50 minutes

## 2025-03-14 ENCOUNTER — SOCIAL WORK (OUTPATIENT)
Dept: BEHAVIORAL/MENTAL HEALTH CLINIC | Facility: CLINIC | Age: 25
End: 2025-03-14
Payer: COMMERCIAL

## 2025-03-14 DIAGNOSIS — F10.10 ALCOHOL USE DISORDER, MILD, ABUSE: ICD-10-CM

## 2025-03-14 DIAGNOSIS — F41.1 GENERALIZED ANXIETY DISORDER: Primary | ICD-10-CM

## 2025-03-14 PROCEDURE — 90834 PSYTX W PT 45 MINUTES: CPT | Performed by: COUNSELOR

## 2025-03-18 NOTE — PSYCH
"Behavioral Health Psychotherapy Progress Note    Psychotherapy Provided: Individual Psychotherapy     1. Generalized anxiety disorder        2. Alcohol use disorder, mild, abuse            Goals addressed in session: Goal 1     DATA: Met with Judi and processed his pending legal appointments where he has been accepted into the [facility] program for first time offenders. We processed the stipulations he needs to be aware of in order to complete the program  During this session, this clinician used the following therapeutic modalities: Client-centered Therapy    Substance Abuse was not addressed during this session. If the client is diagnosed with a co-occurring substance use disorder, please indicate any changes in the frequency or amount of use: Stage of change for addressing substance use diagnoses: No substance use/Not applicable    ASSESSMENT:  Judi Jordan presents with a Euthymic/ normal mood.     his affect is Normal range and intensity, which is congruent, with his mood and the content of the session. The client has made progress on their goals.     Judi Jordan presents with a none risk of suicide, none risk of self-harm, and none risk of harm to others.    For any risk assessment that surpasses a \"low\" rating, a safety plan must be developed.    A safety plan was indicated: no  If yes, describe in detail N/A    PLAN: Between sessions, Judi Jordan will comply with court stipulations and process in session. At the next session, the therapist will use Client-centered Therapy to address self care.    Behavioral Health Treatment Plan and Discharge Planning: Jdui Jordan is aware of and agrees to continue to work on their treatment plan. They have identified and are working toward their discharge goals. yes    Depression Follow-up Plan Completed: No    Visit start and stop times:    03/18/25  Start Time: 1000  Stop Time: 1043  Total Visit Time: 43 minutes  "

## 2025-03-26 ENCOUNTER — HOSPITAL ENCOUNTER (EMERGENCY)
Facility: HOSPITAL | Age: 25
Discharge: HOME | End: 2025-03-26
Attending: STUDENT IN AN ORGANIZED HEALTH CARE EDUCATION/TRAINING PROGRAM
Payer: COMMERCIAL

## 2025-03-26 ENCOUNTER — NURSE TRIAGE (OUTPATIENT)
Age: 25
End: 2025-03-26

## 2025-03-26 ENCOUNTER — APPOINTMENT (EMERGENCY)
Dept: RADIOLOGY | Facility: HOSPITAL | Age: 25
End: 2025-03-26
Payer: COMMERCIAL

## 2025-03-26 VITALS
HEART RATE: 53 BPM | RESPIRATION RATE: 18 BRPM | TEMPERATURE: 98.2 F | OXYGEN SATURATION: 100 % | SYSTOLIC BLOOD PRESSURE: 145 MMHG | DIASTOLIC BLOOD PRESSURE: 92 MMHG

## 2025-03-26 DIAGNOSIS — R00.2 PALPITATIONS: Primary | ICD-10-CM

## 2025-03-26 LAB
ANION GAP SERPL CALCULATED.3IONS-SCNC: 8 MMOL/L (ref 4–13)
BASOPHILS # BLD AUTO: 0.06 THOUSANDS/ÂΜL (ref 0–0.1)
BASOPHILS NFR BLD AUTO: 1 % (ref 0–1)
BUN SERPL-MCNC: 16 MG/DL (ref 5–25)
CALCIUM SERPL-MCNC: 9.7 MG/DL (ref 8.4–10.2)
CARDIAC TROPONIN I PNL SERPL HS: <2 NG/L (ref ?–50)
CHLORIDE SERPL-SCNC: 104 MMOL/L (ref 96–108)
CO2 SERPL-SCNC: 26 MMOL/L (ref 21–32)
CREAT SERPL-MCNC: 1.01 MG/DL (ref 0.6–1.3)
D DIMER PPP FEU-MCNC: <0.27 UG/ML FEU
EOSINOPHIL # BLD AUTO: 0.09 THOUSAND/ÂΜL (ref 0–0.61)
EOSINOPHIL NFR BLD AUTO: 1 % (ref 0–6)
ERYTHROCYTE [DISTWIDTH] IN BLOOD BY AUTOMATED COUNT: 14.6 % (ref 11.6–15.1)
GFR SERPL CREATININE-BSD FRML MDRD: 101 ML/MIN/1.73SQ M
GLUCOSE SERPL-MCNC: 90 MG/DL (ref 65–140)
HCT VFR BLD AUTO: 41.2 % (ref 36.5–49.3)
HGB BLD-MCNC: 14.3 G/DL (ref 12–17)
IMM GRANULOCYTES # BLD AUTO: 0.03 THOUSAND/UL (ref 0–0.2)
IMM GRANULOCYTES NFR BLD AUTO: 0 % (ref 0–2)
LYMPHOCYTES # BLD AUTO: 4.34 THOUSANDS/ÂΜL (ref 0.6–4.47)
LYMPHOCYTES NFR BLD AUTO: 43 % (ref 14–44)
MCH RBC QN AUTO: 31.3 PG (ref 26.8–34.3)
MCHC RBC AUTO-ENTMCNC: 34.7 G/DL (ref 31.4–37.4)
MCV RBC AUTO: 90 FL (ref 82–98)
MONOCYTES # BLD AUTO: 0.6 THOUSAND/ÂΜL (ref 0.17–1.22)
MONOCYTES NFR BLD AUTO: 6 % (ref 4–12)
NEUTROPHILS # BLD AUTO: 4.93 THOUSANDS/ÂΜL (ref 1.85–7.62)
NEUTS SEG NFR BLD AUTO: 49 % (ref 43–75)
NRBC BLD AUTO-RTO: 0 /100 WBCS
PLATELET # BLD AUTO: 230 THOUSANDS/UL (ref 149–390)
PMV BLD AUTO: 9.6 FL (ref 8.9–12.7)
POTASSIUM SERPL-SCNC: 3.8 MMOL/L (ref 3.5–5.3)
RBC # BLD AUTO: 4.57 MILLION/UL (ref 3.88–5.62)
SODIUM SERPL-SCNC: 138 MMOL/L (ref 135–147)
TSH SERPL DL<=0.05 MIU/L-ACNC: 0.77 UIU/ML (ref 0.45–4.5)
WBC # BLD AUTO: 10.05 THOUSAND/UL (ref 4.31–10.16)

## 2025-03-26 PROCEDURE — 85379 FIBRIN DEGRADATION QUANT: CPT | Performed by: STUDENT IN AN ORGANIZED HEALTH CARE EDUCATION/TRAINING PROGRAM

## 2025-03-26 PROCEDURE — 36415 COLL VENOUS BLD VENIPUNCTURE: CPT | Performed by: STUDENT IN AN ORGANIZED HEALTH CARE EDUCATION/TRAINING PROGRAM

## 2025-03-26 PROCEDURE — 84484 ASSAY OF TROPONIN QUANT: CPT | Performed by: STUDENT IN AN ORGANIZED HEALTH CARE EDUCATION/TRAINING PROGRAM

## 2025-03-26 PROCEDURE — 71045 X-RAY EXAM CHEST 1 VIEW: CPT

## 2025-03-26 PROCEDURE — 85025 COMPLETE CBC W/AUTO DIFF WBC: CPT | Performed by: STUDENT IN AN ORGANIZED HEALTH CARE EDUCATION/TRAINING PROGRAM

## 2025-03-26 PROCEDURE — 99285 EMERGENCY DEPT VISIT HI MDM: CPT

## 2025-03-26 PROCEDURE — 80048 BASIC METABOLIC PNL TOTAL CA: CPT | Performed by: STUDENT IN AN ORGANIZED HEALTH CARE EDUCATION/TRAINING PROGRAM

## 2025-03-26 PROCEDURE — 99284 EMERGENCY DEPT VISIT MOD MDM: CPT | Performed by: STUDENT IN AN ORGANIZED HEALTH CARE EDUCATION/TRAINING PROGRAM

## 2025-03-26 PROCEDURE — 84443 ASSAY THYROID STIM HORMONE: CPT | Performed by: STUDENT IN AN ORGANIZED HEALTH CARE EDUCATION/TRAINING PROGRAM

## 2025-03-26 PROCEDURE — 93005 ELECTROCARDIOGRAM TRACING: CPT

## 2025-03-26 RX ORDER — SODIUM CHLORIDE 9 MG/ML
3 INJECTION INTRAVENOUS
Status: DISCONTINUED | OUTPATIENT
Start: 2025-03-26 | End: 2025-03-26 | Stop reason: HOSPADM

## 2025-03-26 NOTE — TELEPHONE ENCOUNTER
"FOLLOW UP: Advised patient go to the ED for further evaluation.     REASON FOR CONVERSATION: Chest Pain    SYMPTOMS: Onset, \"about a week ago.\"  States that he has been having intermittent chest pain and \"heart fluttering.\".  Left sided chest pain with radiation to arm.  Denies SOB.  Describes heart rate as \"fluttery,\" he can not sit still and \"does not feel right.\"    OTHER: Patient states that father was younger than him, with first heart event.  Advised that patient go to the ED for further evaluation.  Patient states that he is at work, but will go.  Advised not to drive himself.      DISPOSITION: Go to ED Now      Reason for Disposition   Heart beating irregularly or very rapidly    Answer Assessment - Initial Assessment Questions  1. LOCATION: \"Where does it hurt?\"        Left side lower, by rib cage   2. RADIATION: \"Does the pain go anywhere else?\" (e.g., into neck, jaw, arms, back)      Arms   3. ONSET: \"When did the chest pain begin?\" (Minutes, hours or days)        A week   4. PATTERN: \"Does the pain come and go, or has it been constant since it started?\"  \"Does it get worse with exertion?\"       Intermittent   5. DURATION: \"How long does it last\" (e.g., seconds, minutes, hours)      Variable   6. SEVERITY: \"How bad is the pain?\"  (e.g., Scale 1-10; mild, moderate, or severe)      Moderate   7. CARDIAC RISK FACTORS: \"Do you have any history of heart problems or risk factors for heart disease?\" (e.g., angina, prior heart attack; diabetes, high blood pressure, high cholesterol, smoker, or strong family history of heart disease)      Denies   8. PULMONARY RISK FACTORS: \"Do you have any history of lung disease?\"  (e.g., blood clots in lung, asthma, emphysema, birth control pills)      Denies   9. CAUSE: \"What do you think is causing the chest pain?\"      Unknown    Protocols used: Chest Pain-Adult-[place]    "

## 2025-03-26 NOTE — ED PROVIDER NOTES
"Time reflects when diagnosis was documented in both MDM as applicable and the Disposition within this note       Time User Action Codes Description Comment    3/26/2025  7:04 PM MoraviaCathy Add [R00.2] Palpitations           ED Disposition       ED Disposition   Discharge    Condition   Stable    Date/Time   Wed Mar 26, 2025  7:04 PM    Comment   Judi LOYD Julian discharge to home/self care.                   Assessment & Plan       Medical Decision Making  25-year-old male with history of tobacco and marijuana use disorder as well as hypertension who presents with several days of \"pounding in my chest\" that is present both at rest and while exerting himself with no chest pain, shortness of breath, lightheadedness/dizziness, cough/hemoptysis, fever/chills, nausea/vomiting/diaphoresis, leg swelling/calf pain, family history of early MI/clotting diathesis, or recent long distance travel/immobilization.    Vitals with bradycardia but otherwise stable and afebrile.  No murmurs/rubs/gallops on cardiac auscultation and 2+ radial pulse bilaterally.  Lungs clear to auscultation.  No lower extremity edema bilaterally.  Negative Homans' sign bilaterally.      Differential diagnosis includes but is not limited to: Arrhythmia, anxiety, less likely ACS, CHF, PE.  I have low clinical suspicion for pneumothorax/hemothorax    Workup and treatment as below:    Imaging: See ED course  EKG: See ED Course  Labs: See ED course  Meds: N/A  Consults: N/A  Reassessment: Patient tolerated p.o. and ambulatory challenges in the emergency department and had stable vitals at time of discharge.      Dispo: Patient was discharged to home with strict return precautions. Patient acknowledged understanding of plan and diagnostic results, and all their questions were answered to their satisfaction.        Amount and/or Complexity of Data Reviewed  Labs: ordered. Decision-making details documented in ED Course.  Radiology: " ordered.    Risk  Prescription drug management.        ED Course as of 03/26/25 1904   Wed Mar 26, 2025   1800 CBC and differential  wnl   1800 ECG with sinus bradycardia with normal axis, normal intervals, incomplete right bundle branch block but no signs of acute ischemia and no evidence of WPW, HOCM, ARVD, A-fib, or long QT.  Compared to prior ECG this is largely unchanged   1801 Chest x-ray with no acute intrathoracic abnormality   1846 D-Dimer, Quant: <0.27   1846 hs TnI 0hr: <2   1852 TSH 3RD GENERATON: 0.768   1903 Basic metabolic panel  wnl       Medications   sodium chloride (PF) 0.9 % injection 3 mL (has no administration in time range)       ED Risk Strat Scores                                                History of Present Illness       Chief Complaint   Patient presents with    Palpitations     Pt c/o of intermittent  chest palpitations for the last week. Pt states he was at work today and the palpitations got worse and had some intermittent sharp pain. Denies SOB/Dizziness        History reviewed. No pertinent past medical history.   Past Surgical History:   Procedure Laterality Date    HAND FRACTURE REPAIR Right     WISDOM TOOTH EXTRACTION        Family History   Problem Relation Age of Onset    Anxiety disorder Mother     Heart disease Father     Coronary artery disease Father     Stroke Father     Arthritis Maternal Grandmother     Testicular cancer Neg Hx     Prostate cancer Neg Hx     Colon cancer Neg Hx       Social History     Tobacco Use    Smoking status: Never    Smokeless tobacco: Never   Vaping Use    Vaping status: Never Used   Substance Use Topics    Alcohol use: Not Currently     Comment: prior excessive drinking    Drug use: Yes     Types: Marijuana      E-Cigarette/Vaping    E-Cigarette Use Never User       E-Cigarette/Vaping Substances      I have reviewed and agree with the history as documented.     25-year-old male with history of tobacco and marijuana use disorder as well as  "hypertension who presents with several days of \"pounding in my chest\" that is present both at rest and while exerting himself with no chest pain, shortness of breath, lightheadedness/dizziness, cough/hemoptysis, fever/chills, nausea/vomiting/diaphoresis, leg swelling/calf pain, family history of early MI/clotting diathesis, or recent long distance travel/immobilization.      Palpitations  Associated symptoms: no back pain, no chest pain, no cough, no dizziness, no nausea, no numbness, no shortness of breath, no vomiting and no weakness        Review of Systems   Constitutional:  Negative for chills and fever.   HENT:  Negative for ear pain and sore throat.    Eyes:  Negative for pain and visual disturbance.   Respiratory:  Negative for cough and shortness of breath.    Cardiovascular:  Positive for palpitations. Negative for chest pain and leg swelling.   Gastrointestinal:  Negative for abdominal pain, blood in stool, constipation, diarrhea, nausea and vomiting.   Genitourinary:  Negative for dysuria and hematuria.   Musculoskeletal:  Negative for arthralgias and back pain.   Skin:  Negative for color change and rash.   Neurological:  Negative for dizziness, seizures, syncope, facial asymmetry, speech difficulty, weakness, light-headedness, numbness and headaches.   Psychiatric/Behavioral:  Negative for decreased concentration, dysphoric mood, hallucinations, self-injury and suicidal ideas. The patient is nervous/anxious. The patient is not hyperactive.    All other systems reviewed and are negative.          Objective       ED Triage Vitals [03/26/25 1729]   Temperature Pulse Blood Pressure Respirations SpO2 Patient Position - Orthostatic VS   98.2 °F (36.8 °C) (!) 53 144/85 16 100 % --      Temp Source Heart Rate Source BP Location FiO2 (%) Pain Score    Oral Monitor -- -- --      Vitals      Date and Time Temp Pulse SpO2 Resp BP Pain Score FACES Pain Rating User   03/26/25 1800 -- 53 100 % -- 145/92 -- -- BY "   03/26/25 1745 -- 63 100 % 18 157/89 -- -- KK   03/26/25 1729 98.2 °F (36.8 °C) 53 100 % 16 144/85 -- -- SM            Physical Exam  Constitutional:       General: He is not in acute distress.     Appearance: He is not ill-appearing or toxic-appearing.   HENT:      Head: Normocephalic.      Nose: Nose normal.      Mouth/Throat:      Mouth: Mucous membranes are moist.      Pharynx: Oropharynx is clear.   Eyes:      Conjunctiva/sclera: Conjunctivae normal.   Cardiovascular:      Rate and Rhythm: Regular rhythm. Bradycardia present.      Heart sounds: Normal heart sounds.   Pulmonary:      Effort: Pulmonary effort is normal.      Breath sounds: Normal breath sounds.   Abdominal:      Palpations: Abdomen is soft.      Tenderness: There is no abdominal tenderness.   Musculoskeletal:      Right lower leg: No edema.      Left lower leg: No edema.   Skin:     General: Skin is warm.      Capillary Refill: Capillary refill takes less than 2 seconds.   Neurological:      Mental Status: He is alert and oriented to person, place, and time.   Psychiatric:      Comments: Anxious mood without SI/HI or AVH         Results Reviewed       Procedure Component Value Units Date/Time    Basic metabolic panel [[id number]] Collected: 03/26/25 1740    Lab Status: Final result Specimen: Blood from Arm, Left Updated: 03/26/25 1855     Sodium 138 mmol/L      Potassium 3.8 mmol/L      Chloride 104 mmol/L      CO2 26 mmol/L      ANION GAP 8 mmol/L      BUN 16 mg/dL      Creatinine 1.01 mg/dL      Glucose 90 mg/dL      Calcium 9.7 mg/dL      eGFR 101 ml/min/1.73sq m     Narrative:      National Kidney Disease Foundation guidelines for Chronic Kidney Disease (CKD):     Stage 1 with normal or high GFR (GFR > 90 mL/min/1.73 square meters)    Stage 2 Mild CKD (GFR = 60-89 mL/min/1.73 square meters)    Stage 3A Moderate CKD (GFR = 45-59 mL/min/1.73 square meters)    Stage 3B Moderate CKD (GFR = 30-44 mL/min/1.73 square meters)    Stage 4 Severe CKD  (GFR = 15-29 mL/min/1.73 square meters)    Stage 5 End Stage CKD (GFR <15 mL/min/1.73 square meters)  Note: GFR calculation is accurate only with a steady state creatinine    TSH, 3rd generation with Free T4 reflex [[id number]]  (Normal) Collected: 03/26/25 1740    Lab Status: Final result Specimen: Blood from Arm, Left Updated: 03/26/25 1852     TSH 3RD GENERATON 0.768 uIU/mL     D-Dimer [[id number]]  (Normal) Collected: 03/26/25 1802    Lab Status: Final result Specimen: Blood from Arm, Left Updated: 03/26/25 1843     D-Dimer, Quant <0.27 ug/ml FEU     HS Troponin 0hr (reflex protocol) [[id number]]  (Normal) Collected: 03/26/25 1740    Lab Status: Final result Specimen: Blood from Arm, Left Updated: 03/26/25 1826     hs TnI 0hr <2 ng/L     CBC and differential [[id number]] Collected: 03/26/25 1740    Lab Status: Final result Specimen: Blood from Arm, Left Updated: 03/26/25 1746     WBC 10.05 Thousand/uL      RBC 4.57 Million/uL      Hemoglobin 14.3 g/dL      Hematocrit 41.2 %      MCV 90 fL      MCH 31.3 pg      MCHC 34.7 g/dL      RDW 14.6 %      MPV 9.6 fL      Platelets 230 Thousands/uL      nRBC 0 /100 WBCs      Segmented % 49 %      Immature Grans % 0 %      Lymphocytes % 43 %      Monocytes % 6 %      Eosinophils Relative 1 %      Basophils Relative 1 %      Absolute Neutrophils 4.93 Thousands/µL      Absolute Immature Grans 0.03 Thousand/uL      Absolute Lymphocytes 4.34 Thousands/µL      Absolute Monocytes 0.60 Thousand/µL      Eosinophils Absolute 0.09 Thousand/µL      Basophils Absolute 0.06 Thousands/µL             X-ray chest 1 view portable    (Results Pending)       Procedures    ED Medication and Procedure Management   Prior to Admission Medications   Prescriptions Last Dose Informant Patient Reported? Taking?   ketoconazole (NIZORAL) 2 % shampoo   No No   Sig: Apply 1 Application topically 2 (two) times a week      Facility-Administered Medications: None     Patient's Medications   Discharge  Prescriptions    No medications on file     No discharge procedures on file.  ED SEPSIS DOCUMENTATION   Time reflects when diagnosis was documented in both MDM as applicable and the Disposition within this note       Time User Action Codes Description Comment    3/26/2025  7:04 PM Cathy Dowell Add [R00.2] Palpitations                  Cathy Dowell MD  03/26/25 9072

## 2025-03-26 NOTE — DISCHARGE INSTRUCTIONS
You were seen in the Emergency Department for: Palpitations    Your workup today showed: Reassuring physical exam, normal lab values, reassuring heart tracing and stable vital signs    Your next steps should include: Please call today to make an appointment with your primary care provider in one week.    Reasons to RETURN IMMEDIATELY to the Emergency Department: worsening symptoms, difficulty breathing, temperature > 100.4 degrees, uncontrollable nausea/vomiting, or any other concerns.

## 2025-03-27 LAB
ATRIAL RATE: 55 BPM
ATRIAL RATE: 59 BPM
P AXIS: 42 DEGREES
P AXIS: 73 DEGREES
PR INTERVAL: 130 MS
PR INTERVAL: 156 MS
QRS AXIS: 47 DEGREES
QRS AXIS: 59 DEGREES
QRSD INTERVAL: 106 MS
QRSD INTERVAL: 108 MS
QT INTERVAL: 406 MS
QT INTERVAL: 426 MS
QTC INTERVAL: 388 MS
QTC INTERVAL: 421 MS
T WAVE AXIS: 170 DEGREES
T WAVE AXIS: 47 DEGREES
VENTRICULAR RATE: 55 BPM
VENTRICULAR RATE: 59 BPM

## 2025-03-27 PROCEDURE — 93010 ELECTROCARDIOGRAM REPORT: CPT | Performed by: INTERNAL MEDICINE

## 2025-04-04 ENCOUNTER — SOCIAL WORK (OUTPATIENT)
Dept: BEHAVIORAL/MENTAL HEALTH CLINIC | Facility: CLINIC | Age: 25
End: 2025-04-04
Payer: COMMERCIAL

## 2025-04-04 DIAGNOSIS — F41.1 GENERALIZED ANXIETY DISORDER: Primary | ICD-10-CM

## 2025-04-04 DIAGNOSIS — F10.10 ALCOHOL USE DISORDER, MILD, ABUSE: ICD-10-CM

## 2025-04-04 PROCEDURE — 90832 PSYTX W PT 30 MINUTES: CPT | Performed by: COUNSELOR

## 2025-04-04 NOTE — PSYCH
Outpatient Behavioral Health Psychotherapy Treatment Plan    Judi Jordan  2000     Date of Initial Psychotherapy Assessment: ***   Date of Current Treatment Plan: 04/04/25  Treatment Plan Target Date: ***  Treatment Plan Expiration Date: ***    Diagnosis:   No diagnosis found.    Area(s) of Need: ***    Long Term Goal 1 (in the client's own words): ***    Stage of Change: {SL AMB PSYCH STAGE OF CHANGE:90842}    Target Date for completion: ***     Anticipated therapeutic modalities: ***     People identified to complete this goal: ***      Objective 1: (identify the means of measuring success in meeting the objective): ***      Objective 2: (identify the means of measuring success in meeting the objective): ***      Long Term Goal 2 (in the client's own words): ***    Stage of Change: {SL AMB PSYCH STAGE OF CHANGE:12048}    Target Date for completion: ***     Anticipated therapeutic modalities: ***     People identified to complete this goal: ***      Objective 1: (identify the means of measuring success in meeting the objective): ***      Objective 2: (identify the means of measuring success in meeting the objective): ***     Long Term Goal 3 (in the client's own words): ***    Stage of Change: {SL AMB PSYCH STAGE OF CHANGE:75013}    Target Date for completion: ***     Anticipated therapeutic modalities: ***     People identified to complete this goal: ***      Objective 1: (identify the means of measuring success in meeting the objective): ***      Objective 2: (identify the means of measuring success in meeting the objective): ***     I am currently under the care of a [facility] psychiatric provider: {YES/NO:20200}    My [facility] psychiatric provider is: ***    I am currently taking psychiatric medications: {SL AMB TAKING PSYCH MEDS:52897}    I feel that I will be ready for discharge from mental health care when I reach the following (measurable goal/objective): ***    For children and adults who have a  legal guardian:   Has there been any change to custody orders and/or guardianship status? {Yes/No/NA:20108}. If yes, attach updated documentation.    I have {SL AMB PSYCH CREATED/UPDATED:92774} my Crisis Plan and have been offered a copy of this plan    Behavioral Health Treatment Plan [facility]: Diagnosis and Treatment Plan explained to Judi Jordan {acknowledge/does not acknowledge:05432} an understanding of their diagnosis. Judi Jordan {SL AMB PSYCH AGREE/DISAGREE:45931} this treatment plan.    I have been offered a copy of this Treatment Plan. {YES/NO:20200}

## 2025-04-08 NOTE — PSYCH
"Behavioral Health Psychotherapy Progress Note    Psychotherapy Provided: Individual Psychotherapy     1. Generalized anxiety disorder        2. Alcohol use disorder, mild, abuse            Goals addressed in session: Goal 1     DATA: Met with Client who is scheduled to attend court to enter into the [facility] program. We explored the expectations of this program to include no alcohol. We also looked at his recent hospital trip due to some cardiac problems and explored ways for him to care fro himself physically as well as mentally.   During this session, this clinician used the following therapeutic modalities: Client-centered Therapy    Substance Abuse was not addressed during this session. If the client is diagnosed with a co-occurring substance use disorder, please indicate any changes in the frequency or amount of use: Stage of change for addressing substance use diagnoses: No substance use/Not applicable    ASSESSMENT:  Judi Jordan presents with a Euthymic/ normal mood.     his affect is Normal range and intensity, which is congruent, with his mood and the content of the session. The client has made progress on their goals.     Judi Jordan presents with a none risk of suicide, none risk of self-harm, and none risk of harm to others.    For any risk assessment that surpasses a \"low\" rating, a safety plan must be developed.    A safety plan was indicated: no  If yes, describe in detail N/A    PLAN: Between sessions, Judi Jordan will comply with Stipulations of court program. At the next session, the therapist will use Client-centered Therapy to address self care.    Behavioral Health Treatment Plan and Discharge Planning: Judi Jordan is aware of and agrees to continue to work on their treatment plan. They have identified and are working toward their discharge goals. yes    Depression Follow-up Plan Completed: No    Visit start and stop times:    04/08/25  Start Time: 0800  Stop Time: 0835  Total Visit " Time: 35 minutes

## 2025-05-16 ENCOUNTER — TELEPHONE (OUTPATIENT)
Dept: PSYCHIATRY | Facility: CLINIC | Age: 25
End: 2025-05-16

## 2025-05-16 NOTE — TELEPHONE ENCOUNTER
Called and LVM for patient to notify that 5/16/25 appt will be cancelled due to provider calling out.    Confirmed already 6/13/25 scheduled appt.

## 2025-06-16 ENCOUNTER — TELEPHONE (OUTPATIENT)
Dept: BEHAVIORAL/MENTAL HEALTH CLINIC | Facility: CLINIC | Age: 25
End: 2025-06-16

## 2025-06-16 NOTE — LETTER
25     Judi Jordan   : 2000   [address] [id number]       It is the policy of [facility] to monitor and manage appointments that have been no-showed or cancelled with less than 48-hour notice. This is necessary to ensure that we are able to provide timely access for all patients to our providers. Undue numbers of unutilized appointments delays necessary medical care for all patients.      Dear Judi Jordan       We are sorry that you missed your appointment with BETTINA Miller on 2025. Your health and follow-up care are important to us. We want to make you aware of your next appointment with BETTINA Miller that is scheduled for Friday, 2025 at 8:00am.    Please be aware that our office policy states that if you 'no show' two or more Therapy appointments without prior notice of cancellation within in a calendar year, you may be discharged from Therapy treatment.  We want to bring this to your attention now to prevent an interruption of your care.  If you have any questions about this policy, please call us at the number above.     Thank you in advance for your cooperation and assistance.       Sincerely,      [facility] Support Staff.

## 2025-06-16 NOTE — TELEPHONE ENCOUNTER
NO-SHOW LETTER MAILED TO Judi Jordan.  ADDRESS: [address] [phone] for appt 6/13/25 with LORRI Myers

## 2025-07-18 ENCOUNTER — TELEPHONE (OUTPATIENT)
Dept: PSYCHIATRY | Facility: CLINIC | Age: 25
End: 2025-07-18

## 2025-07-18 NOTE — TELEPHONE ENCOUNTER
Patient came in office, checked in and was waiting for appt.    Patient was notified (as soon as FD was informed) that provider had called out and appt needed to be cancelled.    As this was patient's last appt, this writer assisted with r/s for 8/22/25. Pt wanted to schedule an additional 2 at a 2 week interval.    Patient did state that provider can adjust appts if they feel it would be best.

## 2025-08-22 ENCOUNTER — SOCIAL WORK (OUTPATIENT)
Dept: BEHAVIORAL/MENTAL HEALTH CLINIC | Facility: CLINIC | Age: 25
End: 2025-08-22
Payer: COMMERCIAL

## 2025-08-22 DIAGNOSIS — F41.1 GENERALIZED ANXIETY DISORDER: Primary | ICD-10-CM

## 2025-08-22 DIAGNOSIS — F10.10 ALCOHOL USE DISORDER, MILD, ABUSE: ICD-10-CM

## 2025-08-22 PROCEDURE — 90834 PSYTX W PT 45 MINUTES: CPT | Performed by: COUNSELOR
